# Patient Record
Sex: FEMALE | Race: WHITE | Employment: FULL TIME | URBAN - METROPOLITAN AREA
[De-identification: names, ages, dates, MRNs, and addresses within clinical notes are randomized per-mention and may not be internally consistent; named-entity substitution may affect disease eponyms.]

---

## 2022-03-10 ENCOUNTER — TRANSCRIBE ORDER (OUTPATIENT)
Dept: SCHEDULING | Age: 22
End: 2022-03-10

## 2022-03-10 DIAGNOSIS — H57.02 ANISOCORIA: Primary | ICD-10-CM

## 2022-03-10 DIAGNOSIS — G44.209 TENSION HEADACHE: ICD-10-CM

## 2022-03-11 ENCOUNTER — HOSPITAL ENCOUNTER (OUTPATIENT)
Dept: MRI IMAGING | Age: 22
Discharge: HOME | End: 2022-03-11
Attending: INTERNAL MEDICINE
Payer: COMMERCIAL

## 2022-03-11 DIAGNOSIS — G44.209 TENSION HEADACHE: ICD-10-CM

## 2022-03-11 DIAGNOSIS — H57.02 ANISOCORIA: ICD-10-CM

## 2022-03-11 PROCEDURE — 74011250636 HC RX REV CODE- 250/636

## 2022-03-11 PROCEDURE — A9576 INJ PROHANCE MULTIPACK: HCPCS

## 2022-03-11 PROCEDURE — 70553 MRI BRAIN STEM W/O & W/DYE: CPT

## 2022-03-11 RX ORDER — SODIUM CHLORIDE 0.9 % (FLUSH) 0.9 %
10 SYRINGE (ML) INJECTION
Status: COMPLETED | OUTPATIENT
Start: 2022-03-11 | End: 2022-03-11

## 2022-03-11 RX ADMIN — Medication 10 ML: at 07:23

## 2022-03-11 RX ADMIN — GADOTERIDOL 12 ML: 279.3 INJECTION, SOLUTION INTRAVENOUS at 07:22

## 2023-05-30 ENCOUNTER — OFFICE VISIT (OUTPATIENT)
Dept: ORTHOPEDIC SURGERY | Age: 23
End: 2023-05-30

## 2023-05-30 VITALS — WEIGHT: 132 LBS

## 2023-05-30 DIAGNOSIS — M77.02 MEDIAL EPICONDYLITIS OF ELBOW, LEFT: Primary | ICD-10-CM

## 2023-05-30 RX ORDER — VILAZODONE HYDROCHLORIDE 20 MG/1
20 TABLET ORAL DAILY
COMMUNITY
Start: 2021-06-17

## 2023-05-30 RX ORDER — METHYLPREDNISOLONE 4 MG/1
TABLET ORAL
Qty: 1 KIT | Refills: 0 | Status: SHIPPED | OUTPATIENT
Start: 2023-05-30

## 2023-05-30 RX ORDER — DEXTROAMPHETAMINE SACCHARATE, AMPHETAMINE ASPARTATE, DEXTROAMPHETAMINE SULFATE AND AMPHETAMINE SULFATE 7.5; 7.5; 7.5; 7.5 MG/1; MG/1; MG/1; MG/1
30 TABLET ORAL 2 TIMES DAILY PRN
COMMUNITY
Start: 2019-11-12

## 2023-05-30 NOTE — PROGRESS NOTES
Name: Momo Saleem  YOB: 2000  Gender: female  MRN: [id number]      CC: Elbow Injury (L)       HPI: Momo Saleem is a 23 y.o. female who presents with Elbow Injury (L)  Asaf Hines is a new patient who presents today with pain in her L elbow. She has had a history of medial epicondylitis in this elbow in 2016, and reports her current symptoms are congruent with those. Her medial epicondyle is tender to palpation, and she has pain and paresthesia extending down the arm, into her fingers. She also reports a burning sensation following this pattern as well. .  She has been using irons that she feels are too heavy which give her overuse of the medial side of the elbow she has pain when she  and lifts in her flexor type motion. She has some physical therapy previously which provided some benefit but she has not had dry needling. Physical Examination:  General: no acute distress  Lungs: breathing easily  CV: regular rhythm by pulse  Left elbow. :  Tenderness palpation over the flexor pronator mass in the medial epicondyle which recreates symptoms. Negative Tinel's over the ulnar nerve but this does create some pain. Does not have recreation of tingling symptoms in her hand. Full passive and active range of motion of her elbow she has some resisted wrist flexion pain over the medial side of the elbow. Negative finger and wrist extension testing nontender over the lateral epicondyle. Motor and sensory function grossly intact with 5 out of 5 motor function no instability to her elbow. Imaging:   No imaging today  All imaging interpreted by myself Omar Lott MD independent of radiology review        Assessment:     ICD-10-CM    1. Medial epicondylitis of elbow, left  M77.02 Amb External Referral To Physical Therapy     methylPREDNISolone (MEDROL, TAMMY,) 4 MG tablet          Plan:   History and exam consistent with golfers elbow medial epicondylitis.   We discussed an

## 2023-05-31 ENCOUNTER — TELEPHONE (OUTPATIENT)
Dept: ORTHOPEDIC SURGERY | Age: 23
End: 2023-05-31

## 2023-05-31 NOTE — TELEPHONE ENCOUNTER
Spoke with patient and let her know someone from injury scripts will each out to her for the topical

## 2023-07-07 ENCOUNTER — TELEPHONE (OUTPATIENT)
Dept: ORTHOPEDIC SURGERY | Age: 23
End: 2023-07-07

## 2023-07-07 DIAGNOSIS — M77.02 MEDIAL EPICONDYLITIS OF ELBOW, LEFT: Primary | ICD-10-CM

## 2023-07-10 ENCOUNTER — OFFICE VISIT (OUTPATIENT)
Dept: ORTHOPEDIC SURGERY | Age: 23
End: 2023-07-10

## 2023-07-10 DIAGNOSIS — M77.02 MEDIAL EPICONDYLITIS OF ELBOW, LEFT: Primary | ICD-10-CM

## 2023-07-10 NOTE — PROGRESS NOTES
Name: Tereza Christianson  YOB: 2000  Gender: female  MRN: [id number]      CC: Elbow Pain (L)       HPI: Tereza Christianson is a 20 y.o. female who presents with Elbow Pain (L)  . Continues to have pain over the medial epicondyle. She had an MRI scan which demonstrated medial epicondylitis        Physical Examination:  General: no acute distress  left elbow: Tenderness to palpation of the common flexor and pronator mass in the medial epicondyle nontender posterior to the medial epicondyle negative Tinel's over the ulnar nerve full range of motion recreation of symptoms with resisted elbow and wrist flexion. Assessment:   1. Medial epicondylitis of elbow, left         Plan:     Continues to Have medial epicondylitis has not responded to rest and topical anti-inflammatories and physical therapy. We discussed cortisone injection versus potentially platelet rich plasma injection. She would like to take a long break off from golf at this point and try the PRP injections. We discussed 1 and potentially a second PRP injection. She elected to proceed with that today. Standard blood draw from antecubital fossa was performed and platelet rich plasma processed and spun in the [employer] from [employer]. This yielded 4 cc of autologous ACP PRP. Ultrasound probe was placed on the left elbow over the flexor pronator mass and in the transverse plane initially this was identified in the longitudinal plane the area of maximal tenderness was identified. We then localized the needle and longitudinal axis and 4 cc of leukocyte rich platelet rich plasma was injected on a 22-gauge needle into the flexor pronator mass in the area of hypoechogenicity after sterile prep. Patient tolerated the procedure well and was given postinjection flare precautions and instructed to avoid strenuous activity and anti-inflammatories for the next 48 to 72 hours. Follow up 2 WEEKS        Omar Harmon MD,

## 2023-07-31 ENCOUNTER — OFFICE VISIT (OUTPATIENT)
Dept: ORTHOPEDIC SURGERY | Age: 23
End: 2023-07-31
Payer: COMMERCIAL

## 2023-07-31 DIAGNOSIS — M77.02 MEDIAL EPICONDYLITIS OF ELBOW, LEFT: Primary | ICD-10-CM

## 2023-07-31 PROCEDURE — G8428 CUR MEDS NOT DOCUMENT: HCPCS | Performed by: ORTHOPAEDIC SURGERY

## 2023-07-31 PROCEDURE — G8421 BMI NOT CALCULATED: HCPCS | Performed by: ORTHOPAEDIC SURGERY

## 2023-07-31 PROCEDURE — 99213 OFFICE O/P EST LOW 20 MIN: CPT | Performed by: ORTHOPAEDIC SURGERY

## 2023-07-31 PROCEDURE — 4004F PT TOBACCO SCREEN RCVD TLK: CPT | Performed by: ORTHOPAEDIC SURGERY

## 2023-07-31 NOTE — PROGRESS NOTES
Name: Mariana Hurt  YOB: 2000  Gender: female  MRN: [id number]      CC: Elbow Pain (L)       HPI: Mariana Hurt is a 20 y.o. female who returns for follow up on  her left elbow. She does think she is doing much better after the PRP injection and is progressing with physical therapy. She has been starting chipping and putting and hopefully progress to short wedge shots next week. Physical Examination:  General: no acute distress  Lungs: breathing easily  CV: regular rhythm by pulse  Left elbow : Still some residual tenderness palpation of the common flexor pronator mass but decreased compared to previously. Still some mild hypersensitivity over the medial epicondyle no radicular ulnar nerve symptoms with Tinel's. Assessment:     ICD-10-CM    1. Medial epicondylitis of elbow, left  M77.02           Plan:   I do think she is making improvement. We talked about progressing her activity back in the golf with her physical therapist.  We will hold off on repeat PRP injection at this time. I will plan to see her back in about 3 weeks. I did review her case with Dr. Porter Shearer of my partner who specializes in hand and upper extremity surgery. She agrees with the treatment plan and the required period of rest due to the overuse injury of the medial epicondylitis as well as the PRP injection which was indicated and potentially a second injection if she is not able to improve to her satisfaction. Matt Hester MD, [address] [facility]

## 2023-09-13 ENCOUNTER — TELEPHONE (OUTPATIENT)
Dept: ORTHOPEDIC SURGERY | Age: 23
End: 2023-09-13

## 2023-09-13 NOTE — TELEPHONE ENCOUNTER
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

## 2023-09-15 NOTE — TELEPHONE ENCOUNTER
Pt will be picking up note from Austin Nraanjo and friend at [place] on Monday 13/94/7119 Otezla Counseling: The side effects of Otezla were discussed with the patient, including but not limited to worsening or new depression, weight loss, diarrhea, nausea, upper respiratory tract infection, and headache. Patient instructed to call the office should any adverse effect occur.  The patient verbalized understanding of the proper use and possible adverse effects of Otezla.  All the patient's questions and concerns were addressed.

## 2024-03-15 NOTE — PROGRESS NOTES
The patient is a 23 y.o. No obstetric history on file. who is seen to discuss her new pregnancy. Pt denies any current unilateral pain, cramping, urinary symptoms, or vaginal bleeding. She is currently taking an over the counter PNV with DHA and folic acid.     Reports long hx irregular menses    Pt is no longer taking adderall and viibryd. Feels well off meds.       LMP:1/17/24  GLORIA based off of LMP:10/23/24  GA today by LMP: 8w5d    US Findings from today 03/18/24:   GYN U/S SECONDARY TO MISSED MENSES   CX WNL   UT IS ANTEVERTED AND HETEROGENOUS   SINGLE IUP WITH FHT= 126   CRL IS NOT C/W LMP. CRL= 7W1D, GLORIA= 11/3/2024. PT STATES CYCLES ARE IRREGULAR.   YS VISUALIZED   ROV VISUALIZED WNL   LOV VISUALIZED WITH HEMORRHAGIC CL   NO ADN MASSES, FREE FLUID OR AMITA NOTED.     HISTORY:    No obstetric history on file.  Patient's last menstrual period was 01/17/2024 (exact date).  Sexual History:  has sex with males  Contraception:  none  Current Outpatient Medications on File Prior to Visit   Medication Sig Dispense Refill    Prenatal MV-Min-Fe Fum-FA-DHA (PRENATAL/FOLIC ACID+DHA PO) Take by mouth      vilazodone HCl (VIIBRYD) 20 MG TABS Take 1 tablet by mouth daily (Patient not taking: Reported on 3/18/2024)      amphetamine-dextroamphetamine (ADDERALL) 30 MG tablet Take 1 tablet by mouth 2 times daily as needed. Max Daily Amount: 60 mg (Patient not taking: Reported on 3/18/2024)      methylPREDNISolone (MEDROL, TAMMY,) 4 MG tablet Take as directed on package (Patient not taking: Reported on 3/18/2024) 1 kit 0     No current facility-administered medications on file prior to visit.       ROS:  Feeling well. No dyspnea or chest pain on exertion.  No abdominal pain, change in bowel habits, black or bloody stools.  No urinary tract symptoms. GYN ROS: she complains of missed period.    PHYSICAL EXAM:  Weight 66.7 kg (147 lb 1.6 oz), last menstrual period 01/17/2024.    The patient appears well, alert, oriented x 3, in no

## 2024-03-18 ENCOUNTER — OFFICE VISIT (OUTPATIENT)
Dept: OBGYN CLINIC | Age: 24
End: 2024-03-18
Payer: COMMERCIAL

## 2024-03-18 ENCOUNTER — PROCEDURE VISIT (OUTPATIENT)
Dept: OBGYN CLINIC | Age: 24
End: 2024-03-18
Payer: COMMERCIAL

## 2024-03-18 VITALS — SYSTOLIC BLOOD PRESSURE: 112 MMHG | WEIGHT: 147.1 LBS | DIASTOLIC BLOOD PRESSURE: 60 MMHG

## 2024-03-18 DIAGNOSIS — N92.6 MISSED MENSES: Primary | ICD-10-CM

## 2024-03-18 DIAGNOSIS — Z32.00 ENCOUNTER FOR PREGNANCY TEST, RESULT UNKNOWN: Primary | ICD-10-CM

## 2024-03-18 DIAGNOSIS — N92.6 MISSED MENSES: ICD-10-CM

## 2024-03-18 LAB
HCG, PREGNANCY, URINE, POC: POSITIVE
VALID INTERNAL CONTROL, POC: YES

## 2024-03-18 PROCEDURE — 76830 TRANSVAGINAL US NON-OB: CPT | Performed by: OBSTETRICS & GYNECOLOGY

## 2024-03-18 PROCEDURE — G8421 BMI NOT CALCULATED: HCPCS | Performed by: NURSE PRACTITIONER

## 2024-03-18 PROCEDURE — 81025 URINE PREGNANCY TEST: CPT | Performed by: NURSE PRACTITIONER

## 2024-03-18 PROCEDURE — G8427 DOCREV CUR MEDS BY ELIG CLIN: HCPCS | Performed by: NURSE PRACTITIONER

## 2024-03-18 PROCEDURE — 1036F TOBACCO NON-USER: CPT | Performed by: NURSE PRACTITIONER

## 2024-03-18 PROCEDURE — 99204 OFFICE O/P NEW MOD 45 MIN: CPT | Performed by: NURSE PRACTITIONER

## 2024-03-18 PROCEDURE — G8484 FLU IMMUNIZE NO ADMIN: HCPCS | Performed by: NURSE PRACTITIONER

## 2024-04-02 ENCOUNTER — TELEPHONE (OUTPATIENT)
Dept: OBGYN CLINIC | Age: 24
End: 2024-04-02

## 2024-04-02 NOTE — TELEPHONE ENCOUNTER
Patient canceled appointment on 4/1 for f/u US and visit.   Called patient and LM advising to call back if needs to reschedule.

## 2024-05-08 ENCOUNTER — ROUTINE PRENATAL (OUTPATIENT)
Dept: OBGYN CLINIC | Age: 24
End: 2024-05-08
Payer: COMMERCIAL

## 2024-05-08 ENCOUNTER — INITIAL PRENATAL (OUTPATIENT)
Dept: OBGYN CLINIC | Age: 24
End: 2024-05-08

## 2024-05-08 ENCOUNTER — PROCEDURE VISIT (OUTPATIENT)
Dept: OBGYN CLINIC | Age: 24
End: 2024-05-08
Payer: COMMERCIAL

## 2024-05-08 VITALS
WEIGHT: 153.3 LBS | BODY MASS INDEX: 24.06 KG/M2 | DIASTOLIC BLOOD PRESSURE: 74 MMHG | HEIGHT: 67 IN | SYSTOLIC BLOOD PRESSURE: 116 MMHG

## 2024-05-08 DIAGNOSIS — O36.80X0 ENCOUNTER TO DETERMINE FETAL VIABILITY OF PREGNANCY, SINGLE OR UNSPECIFIED FETUS: Primary | ICD-10-CM

## 2024-05-08 DIAGNOSIS — Z82.0 FAMILY HISTORY OF MUSCULAR DYSTROPHY: ICD-10-CM

## 2024-05-08 DIAGNOSIS — Z13.89 SCREENING FOR GENITOURINARY CONDITION: ICD-10-CM

## 2024-05-08 DIAGNOSIS — Z34.82 ENCOUNTER FOR SUPERVISION OF OTHER NORMAL PREGNANCY, SECOND TRIMESTER: ICD-10-CM

## 2024-05-08 DIAGNOSIS — Z3A.14 14 WEEKS GESTATION OF PREGNANCY: ICD-10-CM

## 2024-05-08 DIAGNOSIS — Z11.3 SCREENING EXAMINATION FOR VENEREAL DISEASE: ICD-10-CM

## 2024-05-08 DIAGNOSIS — F41.9 ANXIETY: ICD-10-CM

## 2024-05-08 DIAGNOSIS — Z34.82 PRENATAL CARE, SUBSEQUENT PREGNANCY, SECOND TRIMESTER: Primary | ICD-10-CM

## 2024-05-08 DIAGNOSIS — Z34.82 PRENATAL CARE, SUBSEQUENT PREGNANCY, SECOND TRIMESTER: ICD-10-CM

## 2024-05-08 DIAGNOSIS — Z34.02 ENCOUNTER FOR SUPERVISION OF NORMAL FIRST PREGNANCY IN SECOND TRIMESTER: Primary | ICD-10-CM

## 2024-05-08 DIAGNOSIS — Z11.59 SCREENING FOR VIRAL AND CHLAMYDIAL DISEASES: ICD-10-CM

## 2024-05-08 DIAGNOSIS — Z11.8 SCREENING FOR VIRAL AND CHLAMYDIAL DISEASES: ICD-10-CM

## 2024-05-08 PROBLEM — A60.09 GENITAL HERPES AFFECTING PREGNANCY: Status: ACTIVE | Noted: 2024-05-08

## 2024-05-08 PROBLEM — Z34.90 PREGNANCY: Status: ACTIVE | Noted: 2024-05-08

## 2024-05-08 PROBLEM — O98.319 GENITAL HERPES AFFECTING PREGNANCY: Status: ACTIVE | Noted: 2024-05-08

## 2024-05-08 LAB
ABO + RH BLD: NORMAL
BASOPHILS # BLD: 0 K/UL (ref 0–0.2)
BASOPHILS NFR BLD: 1 % (ref 0–2)
BLOOD GROUP ANTIBODIES SERPL: NORMAL
DIFFERENTIAL METHOD BLD: ABNORMAL
EOSINOPHIL # BLD: 0.1 K/UL (ref 0–0.8)
EOSINOPHIL NFR BLD: 2 % (ref 0.5–7.8)
ERYTHROCYTE [DISTWIDTH] IN BLOOD BY AUTOMATED COUNT: 12.8 % (ref 11.9–14.6)
EST. AVERAGE GLUCOSE BLD GHB EST-MCNC: 99 MG/DL
GLUCOSE URINE, POC: NEGATIVE
HBA1C MFR BLD: 5.1 % (ref 0–5.6)
HCT VFR BLD AUTO: 36.4 % (ref 35.8–46.3)
HGB BLD-MCNC: 12.1 G/DL (ref 11.7–15.4)
HIV 1+2 AB+HIV1 P24 AG SERPL QL IA: NONREACTIVE
HIV 1/2 RESULT COMMENT: NORMAL
IMM GRANULOCYTES # BLD AUTO: 0 K/UL (ref 0–0.5)
IMM GRANULOCYTES NFR BLD AUTO: 0 % (ref 0–5)
LYMPHOCYTES # BLD: 1.7 K/UL (ref 0.5–4.6)
LYMPHOCYTES NFR BLD: 22 % (ref 13–44)
MCH RBC QN AUTO: 30.2 PG (ref 26.1–32.9)
MCHC RBC AUTO-ENTMCNC: 33.2 G/DL (ref 31.4–35)
MCV RBC AUTO: 90.8 FL (ref 82–102)
MONOCYTES # BLD: 0.4 K/UL (ref 0.1–1.3)
MONOCYTES NFR BLD: 5 % (ref 4–12)
NEUTS SEG # BLD: 5.6 K/UL (ref 1.7–8.2)
NEUTS SEG NFR BLD: 70 % (ref 43–78)
NRBC # BLD: 0 K/UL (ref 0–0.2)
PLATELET # BLD AUTO: 230 K/UL (ref 150–450)
PMV BLD AUTO: 11.5 FL (ref 9.4–12.3)
PROTEIN,URINE, POC: NEGATIVE
RBC # BLD AUTO: 4.01 M/UL (ref 4.05–5.2)
RUBV IGG SERPL IA-ACNC: 268 IU/ML
T PALLIDUM AB SER QL IA: NONREACTIVE
WBC # BLD AUTO: 7.9 K/UL (ref 4.3–11.1)

## 2024-05-08 PROCEDURE — 0502F SUBSEQUENT PRENATAL CARE: CPT | Performed by: OBSTETRICS & GYNECOLOGY

## 2024-05-08 PROCEDURE — 76816 OB US FOLLOW-UP PER FETUS: CPT | Performed by: OBSTETRICS & GYNECOLOGY

## 2024-05-08 PROCEDURE — 81002 URINALYSIS NONAUTO W/O SCOPE: CPT | Performed by: OBSTETRICS & GYNECOLOGY

## 2024-05-08 RX ORDER — VILAZODONE HYDROCHLORIDE 20 MG/1
20 TABLET ORAL DAILY
COMMUNITY

## 2024-05-08 RX ORDER — VALACYCLOVIR HYDROCHLORIDE 500 MG/1
500 TABLET, FILM COATED ORAL 2 TIMES DAILY
COMMUNITY
End: 2024-05-08 | Stop reason: SDUPTHER

## 2024-05-08 RX ORDER — VALACYCLOVIR HYDROCHLORIDE 500 MG/1
500 TABLET, FILM COATED ORAL 2 TIMES DAILY
Qty: 60 TABLET | Refills: 11 | Status: SHIPPED | OUTPATIENT
Start: 2024-05-08

## 2024-05-08 RX ORDER — DEXTROAMPHETAMINE SACCHARATE, AMPHETAMINE ASPARTATE MONOHYDRATE, DEXTROAMPHETAMINE SULFATE AND AMPHETAMINE SULFATE 3.75; 3.75; 3.75; 3.75 MG/1; MG/1; MG/1; MG/1
15 CAPSULE, EXTENDED RELEASE ORAL EVERY MORNING
COMMUNITY

## 2024-05-08 NOTE — PROGRESS NOTES
Erlinda Downing  presents today for nob talk.  EDC 11/3/24 based of of LMP.  Patient is currently 14 days 3 weeks pregnant.    Patient education was discussed in depth and OB book reviewed with patient.  [facility] policies reviewed with patient.    Genetic testing discussed in depth with patient.  Patient elects NIPT/Carrier screening.    Past Medical History: hx of herpes infection-genital type 1 per patient.  Anxiety-took Adderrall and Viibryd prior to pregnancy.  She would like to discuss these medications with the provider today.  Patient had a sibling born with MD, and lived 6 days.  Patient elects carrier screening today, as she has never been tested.    Patient c/o:none    She is seeing Dr. Chand today for nob exam.    All questions answered patient voiced full understanding, consents signed.  Patient encouraged to call with questions or concerns.

## 2024-05-08 NOTE — PROGRESS NOTES
Ms. Downing   presents today for her initial OB exam.  She is present with her mother.    Gc/chl/trich sent off of urine today  Elects NIPT/[facility] Carrier screen today  Requests refill on Valtrex    Last pap smear: 23 (Negative, HPV not performed)- [facility]    Patient's last menstrual period was 2024 (exact date).  Estimated Date of Delivery: 11/3/24  OB History:   OB History    Para Term  AB Living   1 0 0 0 0 0   SAB IAB Ectopic Molar Multiple Live Births   0 0 0 0 0 0      # Outcome Date GA Lbr Jeet/2nd Weight Sex Delivery Anes PTL Lv   1 Current                Past Gyn History:   GYN History         Menarche - age 14.  Always irregular.  On ocps in high school.  Iud in college -removed in .        Allergies:   No Known Allergies    Medication History:  Current Outpatient Medications   Medication Sig Dispense Refill    Prenatal MV-Min-Fe Fum-FA-DHA (PRENATAL/FOLIC ACID+DHA PO) Take by mouth       No current facility-administered medications for this visit.       Past Medical History:  No past medical history on file.    Past Surgical History:  No past surgical history on file.    Social History:  Social History     Socioeconomic History    Marital status:   -since October.       Spouse name: Not on file    Number of children: Not on file    Years of education: Not on file    Highest education level: Not on file   Occupational History    Professional [employer]  Her  is in [employer] for [employer].     Tobacco Use    Smoking status: Never    Smokeless tobacco: Never   Substance and Sexual Activity    Alcohol use: Not on file    Drug use: Not on file    Sexual activity: Not on file   Other Topics Concern    Exercise:  active    Social History Narrative    Not on file     Social Determinants of Health     Financial Resource Strain: Not on file   Food Insecurity: Not on file   Transportation Needs: Not on file   Physical Activity: Not on file   Stress: Not on file   Social

## 2024-05-09 LAB — VZV IGG SER IA-ACNC: 1327 INDEX

## 2024-05-10 LAB
C TRACH RRNA SPEC QL NAA+PROBE: NEGATIVE
HBV SURFACE AG SERPL QL IA: NEGATIVE
HCV AB SERPL QL IA: NORMAL
HCV IGG SERPL QL IA: NON REACTIVE S/CO RATIO
N GONORRHOEA RRNA SPEC QL NAA+PROBE: NEGATIVE
SPECIMEN SOURCE: NORMAL
T VAGINALIS RRNA SPEC QL NAA+PROBE: NEGATIVE

## 2024-05-11 LAB
BACTERIA SPEC CULT: NORMAL
SERVICE CMNT-IMP: NORMAL

## 2024-05-13 LAB
HGB A MFR BLD: 97.5 % (ref 96.4–98.8)
HGB A2 MFR BLD COLUMN CHROM: 2.5 % (ref 1.8–3.2)
HGB F MFR BLD: 0 % (ref 0–2)
HGB FRACT BLD-IMP: NORMAL
HGB S MFR BLD: 0 %

## 2024-05-19 LAB
Lab: NEGATIVE
Lab: NORMAL
NTRA CYSTIC FIBROSIS: NEGATIVE
NTRA DUCHENNE/BECKER MUSCULAR DYSTROPHY: NEGATIVE
NTRA FRAGILE X SYNDROME: NEGATIVE
NTRA SPINAL MUSCULAR ATROPHY: NEGATIVE

## 2024-05-20 LAB
Lab: NORMAL
NTRA FETAL FRACTION: NORMAL
NTRA GENDER OF FETUS: NORMAL
NTRA MONOSOMY X AGE-BASED RISK TEXT: NORMAL
NTRA MONOSOMY X RESULT TEXT: NORMAL
NTRA MONOSOMY X RISK SCORE TEXT: NORMAL
NTRA TRIPLOIDY RESULT TEXT: NORMAL
NTRA TRISOMY 13 AGE-BASED RISK TEXT: NORMAL
NTRA TRISOMY 13 RESULT TEXT: NORMAL
NTRA TRISOMY 13 RISK SCORE TEXT: NORMAL
NTRA TRISOMY 18 AGE-BASED RISK TEXT: NORMAL
NTRA TRISOMY 18 RESULT TEXT: NORMAL
NTRA TRISOMY 18 RISK SCORE TEXT: NORMAL
NTRA TRISOMY 21 AGE-BASED RISK TEXT: NORMAL
NTRA TRISOMY 21 RESULT TEXT: NORMAL
NTRA TRISOMY 21 RISK SCORE TEXT: NORMAL

## 2024-05-30 SDOH — ECONOMIC STABILITY: INCOME INSECURITY: HOW HARD IS IT FOR YOU TO PAY FOR THE VERY BASICS LIKE FOOD, HOUSING, MEDICAL CARE, AND HEATING?: NOT HARD AT ALL

## 2024-05-30 SDOH — ECONOMIC STABILITY: HOUSING INSECURITY
IN THE LAST 12 MONTHS, WAS THERE A TIME WHEN YOU DID NOT HAVE A STEADY PLACE TO SLEEP OR SLEPT IN A SHELTER (INCLUDING NOW)?: NO

## 2024-05-30 SDOH — ECONOMIC STABILITY: FOOD INSECURITY: WITHIN THE PAST 12 MONTHS, THE FOOD YOU BOUGHT JUST DIDN'T LAST AND YOU DIDN'T HAVE MONEY TO GET MORE.: NEVER TRUE

## 2024-05-30 SDOH — ECONOMIC STABILITY: FOOD INSECURITY: WITHIN THE PAST 12 MONTHS, YOU WORRIED THAT YOUR FOOD WOULD RUN OUT BEFORE YOU GOT MONEY TO BUY MORE.: NEVER TRUE

## 2024-05-30 SDOH — ECONOMIC STABILITY: TRANSPORTATION INSECURITY
IN THE PAST 12 MONTHS, HAS LACK OF TRANSPORTATION KEPT YOU FROM MEETINGS, WORK, OR FROM GETTING THINGS NEEDED FOR DAILY LIVING?: NO

## 2024-05-31 ENCOUNTER — ROUTINE PRENATAL (OUTPATIENT)
Dept: OBGYN CLINIC | Age: 24
End: 2024-05-31
Payer: COMMERCIAL

## 2024-05-31 VITALS — DIASTOLIC BLOOD PRESSURE: 60 MMHG | WEIGHT: 155.9 LBS | SYSTOLIC BLOOD PRESSURE: 112 MMHG | BODY MASS INDEX: 24.42 KG/M2

## 2024-05-31 DIAGNOSIS — Z34.82 PRENATAL CARE, SUBSEQUENT PREGNANCY, SECOND TRIMESTER: Primary | ICD-10-CM

## 2024-05-31 DIAGNOSIS — Z3A.17 17 WEEKS GESTATION OF PREGNANCY: ICD-10-CM

## 2024-05-31 DIAGNOSIS — Z13.89 SCREENING FOR GENITOURINARY CONDITION: ICD-10-CM

## 2024-05-31 LAB
GLUCOSE URINE, POC: NEGATIVE
PROTEIN,URINE, POC: NEGATIVE

## 2024-05-31 PROCEDURE — 81002 URINALYSIS NONAUTO W/O SCOPE: CPT | Performed by: NURSE PRACTITIONER

## 2024-05-31 PROCEDURE — 0502F SUBSEQUENT PRENATAL CARE: CPT | Performed by: NURSE PRACTITIONER

## 2024-06-20 ENCOUNTER — PROCEDURE VISIT (OUTPATIENT)
Dept: OBGYN CLINIC | Age: 24
End: 2024-06-20
Payer: COMMERCIAL

## 2024-06-20 ENCOUNTER — ROUTINE PRENATAL (OUTPATIENT)
Dept: OBGYN CLINIC | Age: 24
End: 2024-06-20
Payer: COMMERCIAL

## 2024-06-20 VITALS — BODY MASS INDEX: 24.43 KG/M2 | WEIGHT: 156 LBS | DIASTOLIC BLOOD PRESSURE: 62 MMHG | SYSTOLIC BLOOD PRESSURE: 116 MMHG

## 2024-06-20 DIAGNOSIS — Z3A.20 20 WEEKS GESTATION OF PREGNANCY: ICD-10-CM

## 2024-06-20 DIAGNOSIS — Z13.89 SCREENING FOR GENITOURINARY CONDITION: ICD-10-CM

## 2024-06-20 DIAGNOSIS — Z36.89 SCREENING, ANTENATAL, FOR FETAL ANATOMIC SURVEY: Primary | ICD-10-CM

## 2024-06-20 DIAGNOSIS — Z34.82 PRENATAL CARE, SUBSEQUENT PREGNANCY, SECOND TRIMESTER: Primary | ICD-10-CM

## 2024-06-20 LAB
GLUCOSE URINE, POC: NEGATIVE
PROTEIN,URINE, POC: NEGATIVE

## 2024-06-20 PROCEDURE — 0502F SUBSEQUENT PRENATAL CARE: CPT | Performed by: OBSTETRICS & GYNECOLOGY

## 2024-06-20 PROCEDURE — 81002 URINALYSIS NONAUTO W/O SCOPE: CPT | Performed by: OBSTETRICS & GYNECOLOGY

## 2024-06-20 PROCEDURE — 76805 OB US >/= 14 WKS SNGL FETUS: CPT | Performed by: OBSTETRICS & GYNECOLOGY

## 2024-06-20 NOTE — PROGRESS NOTES
US findings:  Anatomy US preformed.   All visualized anatomy appears WNL.   ANATOMY COMPLETE   Cx WNL TVUS preformed. Placenta tip 2.4cm from internal os.   Placenta Anterior Grade 1   Breech   NIPT Low Risk Female

## 2024-06-20 NOTE — PROGRESS NOTES
Ptl precautions. Anatomy us today.  Overall growth 35% and ac 28%.  Cervical length.  All anatomy wnl. Anterior placenta.  Pt not really feeling baby move yet.  Eating well and exercising most days of the week.  Breech.  Rtc 4 weeks.

## 2024-07-17 ENCOUNTER — ROUTINE PRENATAL (OUTPATIENT)
Dept: OBGYN CLINIC | Age: 24
End: 2024-07-17
Payer: COMMERCIAL

## 2024-07-17 VITALS — SYSTOLIC BLOOD PRESSURE: 124 MMHG | WEIGHT: 162.6 LBS | BODY MASS INDEX: 25.47 KG/M2 | DIASTOLIC BLOOD PRESSURE: 72 MMHG

## 2024-07-17 DIAGNOSIS — Z34.82 PRENATAL CARE, SUBSEQUENT PREGNANCY, SECOND TRIMESTER: Primary | ICD-10-CM

## 2024-07-17 DIAGNOSIS — Z13.89 SCREENING FOR GENITOURINARY CONDITION: ICD-10-CM

## 2024-07-17 DIAGNOSIS — Z3A.24 24 WEEKS GESTATION OF PREGNANCY: ICD-10-CM

## 2024-07-17 LAB
GLUCOSE URINE, POC: NEGATIVE
PROTEIN,URINE, POC: NEGATIVE

## 2024-07-17 PROCEDURE — 0502F SUBSEQUENT PRENATAL CARE: CPT | Performed by: NURSE PRACTITIONER

## 2024-07-17 PROCEDURE — 81002 URINALYSIS NONAUTO W/O SCOPE: CPT | Performed by: NURSE PRACTITIONER

## 2024-08-15 ENCOUNTER — ROUTINE PRENATAL (OUTPATIENT)
Dept: OBGYN CLINIC | Age: 24
End: 2024-08-15
Payer: COMMERCIAL

## 2024-08-15 VITALS — WEIGHT: 166.6 LBS | DIASTOLIC BLOOD PRESSURE: 74 MMHG | SYSTOLIC BLOOD PRESSURE: 120 MMHG | BODY MASS INDEX: 26.09 KG/M2

## 2024-08-15 DIAGNOSIS — Z34.83 PRENATAL CARE, SUBSEQUENT PREGNANCY, THIRD TRIMESTER: Primary | ICD-10-CM

## 2024-08-15 DIAGNOSIS — Z3A.28 28 WEEKS GESTATION OF PREGNANCY: ICD-10-CM

## 2024-08-15 DIAGNOSIS — Z13.0 SCREENING FOR IRON DEFICIENCY ANEMIA: ICD-10-CM

## 2024-08-15 DIAGNOSIS — Z13.1 SCREENING FOR DIABETES MELLITUS: ICD-10-CM

## 2024-08-15 DIAGNOSIS — Z13.89 SCREENING FOR GENITOURINARY CONDITION: ICD-10-CM

## 2024-08-15 LAB
BASOPHILS # BLD: 0 K/UL (ref 0–0.2)
BASOPHILS NFR BLD: 0 % (ref 0–2)
DIFFERENTIAL METHOD BLD: ABNORMAL
EOSINOPHIL # BLD: 0.1 K/UL (ref 0–0.8)
EOSINOPHIL NFR BLD: 1 % (ref 0.5–7.8)
ERYTHROCYTE [DISTWIDTH] IN BLOOD BY AUTOMATED COUNT: 12.4 % (ref 11.9–14.6)
GLUCOSE 1 HOUR: 88 MG/DL
GLUCOSE URINE, POC: NEGATIVE
HCT VFR BLD AUTO: 34.6 % (ref 35.8–46.3)
HGB BLD-MCNC: 10.6 G/DL (ref 11.7–15.4)
IMM GRANULOCYTES # BLD AUTO: 0.1 K/UL (ref 0–0.5)
IMM GRANULOCYTES NFR BLD AUTO: 1 % (ref 0–5)
LYMPHOCYTES # BLD: 1.7 K/UL (ref 0.5–4.6)
LYMPHOCYTES NFR BLD: 19 % (ref 13–44)
MCH RBC QN AUTO: 28.6 PG (ref 26.1–32.9)
MCHC RBC AUTO-ENTMCNC: 30.6 G/DL (ref 31.4–35)
MCV RBC AUTO: 93.3 FL (ref 82–102)
MONOCYTES # BLD: 0.6 K/UL (ref 0.1–1.3)
MONOCYTES NFR BLD: 6 % (ref 4–12)
NEUTS SEG # BLD: 6.6 K/UL (ref 1.7–8.2)
NEUTS SEG NFR BLD: 73 % (ref 43–78)
NRBC # BLD: 0 K/UL (ref 0–0.2)
PLATELET # BLD AUTO: 250 K/UL (ref 150–450)
PMV BLD AUTO: 10.6 FL (ref 9.4–12.3)
PROTEIN,URINE, POC: NEGATIVE
RBC # BLD AUTO: 3.71 M/UL (ref 4.05–5.2)
T PALLIDUM AB SER QL IA: NONREACTIVE
WBC # BLD AUTO: 9 K/UL (ref 4.3–11.1)

## 2024-08-15 PROCEDURE — 0502F SUBSEQUENT PRENATAL CARE: CPT | Performed by: OBSTETRICS & GYNECOLOGY

## 2024-08-15 PROCEDURE — 81002 URINALYSIS NONAUTO W/O SCOPE: CPT | Performed by: OBSTETRICS & GYNECOLOGY

## 2024-09-16 ENCOUNTER — ROUTINE PRENATAL (OUTPATIENT)
Dept: OBGYN CLINIC | Age: 24
End: 2024-09-16
Payer: COMMERCIAL

## 2024-09-16 VITALS — WEIGHT: 171.4 LBS | BODY MASS INDEX: 26.85 KG/M2 | DIASTOLIC BLOOD PRESSURE: 82 MMHG | SYSTOLIC BLOOD PRESSURE: 128 MMHG

## 2024-09-16 DIAGNOSIS — Z34.83 PRENATAL CARE, SUBSEQUENT PREGNANCY, THIRD TRIMESTER: Primary | ICD-10-CM

## 2024-09-16 DIAGNOSIS — Z13.89 SCREENING FOR GENITOURINARY CONDITION: ICD-10-CM

## 2024-09-16 DIAGNOSIS — Z3A.33 33 WEEKS GESTATION OF PREGNANCY: ICD-10-CM

## 2024-09-16 DIAGNOSIS — Z82.0 FAMILY HISTORY OF MUSCULAR DYSTROPHY: ICD-10-CM

## 2024-09-16 LAB
GLUCOSE URINE, POC: NEGATIVE
PROTEIN,URINE, POC: NEGATIVE

## 2024-09-16 PROCEDURE — 0502F SUBSEQUENT PRENATAL CARE: CPT | Performed by: NURSE PRACTITIONER

## 2024-09-16 PROCEDURE — 81002 URINALYSIS NONAUTO W/O SCOPE: CPT | Performed by: NURSE PRACTITIONER

## 2024-10-02 ENCOUNTER — ROUTINE PRENATAL (OUTPATIENT)
Dept: OBGYN CLINIC | Age: 24
End: 2024-10-02
Payer: COMMERCIAL

## 2024-10-02 VITALS — SYSTOLIC BLOOD PRESSURE: 140 MMHG | DIASTOLIC BLOOD PRESSURE: 78 MMHG

## 2024-10-02 DIAGNOSIS — Z34.83 PRENATAL CARE, SUBSEQUENT PREGNANCY IN THIRD TRIMESTER: Primary | ICD-10-CM

## 2024-10-02 DIAGNOSIS — Z13.89 SCREENING FOR GENITOURINARY CONDITION: ICD-10-CM

## 2024-10-02 DIAGNOSIS — R03.0 ELEVATED BLOOD PRESSURE READING: ICD-10-CM

## 2024-10-02 DIAGNOSIS — Z3A.35 35 WEEKS GESTATION OF PREGNANCY: ICD-10-CM

## 2024-10-02 LAB
GLUCOSE URINE, POC: NEGATIVE
PROTEIN,URINE, POC: NEGATIVE

## 2024-10-02 PROCEDURE — 0502F SUBSEQUENT PRENATAL CARE: CPT | Performed by: OBSTETRICS & GYNECOLOGY

## 2024-10-02 PROCEDURE — 81002 URINALYSIS NONAUTO W/O SCOPE: CPT | Performed by: OBSTETRICS & GYNECOLOGY

## 2024-10-02 NOTE — PROGRESS NOTES
Repeat BP: 142/80   Has some swelling in ankles.   Patient states she has been stressed the past few day with everything going on.   Denies headache.   Denies change in vision.

## 2024-10-02 NOTE — PROGRESS NOTES
142 / 80 - repeat blood pressure. Pt feels well. Still without power- stressed. Her  is home.    Kick counts and ptl precautions.  Starting to have pelvic pressure and likely bh contractions.  Pree labs today. Likely blood pressure elevation from stress.    Rtc Friday for growth us  /bpp /luis daniel.

## 2024-10-03 LAB
BASOPHILS # BLD: 0 K/UL (ref 0–0.2)
BASOPHILS NFR BLD: 1 % (ref 0–2)
DIFFERENTIAL METHOD BLD: ABNORMAL
EOSINOPHIL # BLD: 0 K/UL (ref 0–0.8)
EOSINOPHIL NFR BLD: 1 % (ref 0.5–7.8)
ERYTHROCYTE [DISTWIDTH] IN BLOOD BY AUTOMATED COUNT: 13.3 % (ref 11.9–14.6)
HCT VFR BLD AUTO: 33.6 % (ref 35.8–46.3)
HGB BLD-MCNC: 9.7 G/DL (ref 11.7–15.4)
IMM GRANULOCYTES # BLD AUTO: 0.2 K/UL (ref 0–0.5)
IMM GRANULOCYTES NFR BLD AUTO: 2 % (ref 0–5)
LYMPHOCYTES # BLD: 1.7 K/UL (ref 0.5–4.6)
LYMPHOCYTES NFR BLD: 20 % (ref 13–44)
MCH RBC QN AUTO: 26.3 PG (ref 26.1–32.9)
MCHC RBC AUTO-ENTMCNC: 28.9 G/DL (ref 31.4–35)
MCV RBC AUTO: 91.1 FL (ref 82–102)
MONOCYTES # BLD: 0.6 K/UL (ref 0.1–1.3)
MONOCYTES NFR BLD: 6 % (ref 4–12)
NEUTS SEG # BLD: 6.3 K/UL (ref 1.7–8.2)
NEUTS SEG NFR BLD: 71 % (ref 43–78)
NRBC # BLD: 0 K/UL (ref 0–0.2)
PLATELET # BLD AUTO: 257 K/UL (ref 150–450)
PMV BLD AUTO: 11.6 FL (ref 9.4–12.3)
RBC # BLD AUTO: 3.69 M/UL (ref 4.05–5.2)
WBC # BLD AUTO: 8.8 K/UL (ref 4.3–11.1)

## 2024-10-04 ENCOUNTER — ROUTINE PRENATAL (OUTPATIENT)
Dept: OBGYN CLINIC | Age: 24
End: 2024-10-04

## 2024-10-04 ENCOUNTER — PROCEDURE VISIT (OUTPATIENT)
Dept: OBGYN CLINIC | Age: 24
End: 2024-10-04
Payer: COMMERCIAL

## 2024-10-04 VITALS — SYSTOLIC BLOOD PRESSURE: 138 MMHG | BODY MASS INDEX: 27.25 KG/M2 | DIASTOLIC BLOOD PRESSURE: 78 MMHG | WEIGHT: 174 LBS

## 2024-10-04 DIAGNOSIS — Z3A.35 35 WEEKS GESTATION OF PREGNANCY: ICD-10-CM

## 2024-10-04 DIAGNOSIS — A60.09 GENITAL HERPES AFFECTING PREGNANCY IN THIRD TRIMESTER: ICD-10-CM

## 2024-10-04 DIAGNOSIS — Z13.89 SCREENING FOR GENITOURINARY CONDITION: ICD-10-CM

## 2024-10-04 DIAGNOSIS — O98.313 GENITAL HERPES AFFECTING PREGNANCY IN THIRD TRIMESTER: ICD-10-CM

## 2024-10-04 DIAGNOSIS — R03.0 ELEVATED HEART RATE WITH ELEVATED BLOOD PRESSURE WITHOUT DIAGNOSIS OF HYPERTENSION: ICD-10-CM

## 2024-10-04 DIAGNOSIS — Z34.83 PRENATAL CARE, SUBSEQUENT PREGNANCY IN THIRD TRIMESTER: Primary | ICD-10-CM

## 2024-10-04 DIAGNOSIS — O26.843 UTERINE SIZE DATE DISCREPANCY, THIRD TRIMESTER: Primary | ICD-10-CM

## 2024-10-04 DIAGNOSIS — R03.0 ELEVATED BLOOD PRESSURE READING: ICD-10-CM

## 2024-10-04 DIAGNOSIS — R00.9 ELEVATED HEART RATE WITH ELEVATED BLOOD PRESSURE WITHOUT DIAGNOSIS OF HYPERTENSION: ICD-10-CM

## 2024-10-04 LAB
ALBUMIN SERPL-MCNC: 3 G/DL (ref 3.5–5)
ALBUMIN/GLOB SERPL: 0.8 (ref 1–1.9)
ALP SERPL-CCNC: 154 U/L (ref 35–104)
ALT SERPL-CCNC: 25 U/L (ref 8–45)
ANION GAP SERPL CALC-SCNC: 13 MMOL/L (ref 9–18)
AST SERPL-CCNC: 37 U/L (ref 15–37)
BILIRUB SERPL-MCNC: 0.2 MG/DL (ref 0–1.2)
BUN SERPL-MCNC: 11 MG/DL (ref 6–23)
CALCIUM SERPL-MCNC: 9.1 MG/DL (ref 8.8–10.2)
CHLORIDE SERPL-SCNC: 104 MMOL/L (ref 98–107)
CO2 SERPL-SCNC: 20 MMOL/L (ref 20–28)
CREAT SERPL-MCNC: 0.75 MG/DL (ref 0.6–1.1)
CREAT UR-MCNC: 18.3 MG/DL (ref 28–217)
GLOBULIN SER CALC-MCNC: 3.6 G/DL (ref 2.3–3.5)
GLUCOSE SERPL-MCNC: 72 MG/DL (ref 70–99)
GLUCOSE URINE, POC: NEGATIVE
LDH SERPL L TO P-CCNC: 418 U/L (ref 127–281)
POTASSIUM SERPL-SCNC: 4.5 MMOL/L (ref 3.5–5.1)
PROT SERPL-MCNC: 6.6 G/DL (ref 6.3–8.2)
PROT UR-MCNC: <6 MG/DL
PROT/CREAT UR-RTO: ABNORMAL
PROTEIN,URINE, POC: NEGATIVE
SODIUM SERPL-SCNC: 137 MMOL/L (ref 136–145)
URATE SERPL-MCNC: 4 MG/DL (ref 2.5–7.1)

## 2024-10-04 PROCEDURE — 76818 FETAL BIOPHYS PROFILE W/NST: CPT | Performed by: STUDENT IN AN ORGANIZED HEALTH CARE EDUCATION/TRAINING PROGRAM

## 2024-10-04 PROCEDURE — 76816 OB US FOLLOW-UP PER FETUS: CPT | Performed by: STUDENT IN AN ORGANIZED HEALTH CARE EDUCATION/TRAINING PROGRAM

## 2024-10-04 NOTE — PROGRESS NOTES
01/17/2024 (Exact Date)   BMI 27.25 kg/m²        ASSESSMENT / PLAN:  1. Prenatal care, subsequent pregnancy in third trimester  -     AMB POC OB URINE DIP  2. 35 weeks gestation of pregnancy  Overview:  NIPT: low risk  Carrier: neg x 4  GTT: 88  Flu:  Tdap/RSV:  Orders:  -     AMB POC OB URINE DIP  3. Elevated heart rate with elevated blood pressure without diagnosis of hypertension  Overview:  10/2/24: MRBP x2 in office, not >4h apart. PreE labs and P:C pending.    10/4/24: /78. Will continue to monitor closely.  4. Genital herpes affecting pregnancy in third trimester  Overview:  Takes Valtrex as needed.  Daily @ 36 weeks.  5. Screening for genitourinary condition  -     AMB POC OB URINE DIP         Natividad Singleton MD

## 2024-10-07 ENCOUNTER — TELEPHONE (OUTPATIENT)
Dept: OBGYN CLINIC | Age: 24
End: 2024-10-07

## 2024-10-07 PROBLEM — O99.019 ANEMIA AFFECTING PREGNANCY: Status: ACTIVE | Noted: 2024-10-07

## 2024-10-07 NOTE — TELEPHONE ENCOUNTER
----- Message from Dr. Rebekah Chand DO sent at 10/7/2024  8:56 AM EDT -----  Pree labs are normal.  Hg is low.  Daily oral iron / iron rich diet.

## 2024-10-08 ENCOUNTER — PROCEDURE VISIT (OUTPATIENT)
Dept: OBGYN CLINIC | Age: 24
End: 2024-10-08
Payer: COMMERCIAL

## 2024-10-08 ENCOUNTER — ROUTINE PRENATAL (OUTPATIENT)
Dept: OBGYN CLINIC | Age: 24
End: 2024-10-08
Payer: COMMERCIAL

## 2024-10-08 VITALS — WEIGHT: 175.3 LBS | BODY MASS INDEX: 27.46 KG/M2 | SYSTOLIC BLOOD PRESSURE: 128 MMHG | DIASTOLIC BLOOD PRESSURE: 78 MMHG

## 2024-10-08 DIAGNOSIS — R00.9 ELEVATED HEART RATE WITH ELEVATED BLOOD PRESSURE WITHOUT DIAGNOSIS OF HYPERTENSION: ICD-10-CM

## 2024-10-08 DIAGNOSIS — Z3A.36 36 WEEKS GESTATION OF PREGNANCY: ICD-10-CM

## 2024-10-08 DIAGNOSIS — R03.0 ELEVATED HEART RATE WITH ELEVATED BLOOD PRESSURE WITHOUT DIAGNOSIS OF HYPERTENSION: ICD-10-CM

## 2024-10-08 DIAGNOSIS — Z34.83 PRENATAL CARE, SUBSEQUENT PREGNANCY, THIRD TRIMESTER: Primary | ICD-10-CM

## 2024-10-08 DIAGNOSIS — Z36.85 SCREENING, ANTENATAL, FOR STREPTOCOCCUS B: ICD-10-CM

## 2024-10-08 DIAGNOSIS — R00.9 ELEVATED HEART RATE WITH ELEVATED BLOOD PRESSURE WITHOUT DIAGNOSIS OF HYPERTENSION: Primary | ICD-10-CM

## 2024-10-08 DIAGNOSIS — R03.0 ELEVATED HEART RATE WITH ELEVATED BLOOD PRESSURE WITHOUT DIAGNOSIS OF HYPERTENSION: Primary | ICD-10-CM

## 2024-10-08 DIAGNOSIS — Z13.89 SCREENING FOR GENITOURINARY CONDITION: ICD-10-CM

## 2024-10-08 LAB
GLUCOSE URINE, POC: NEGATIVE
PROTEIN,URINE, POC: NEGATIVE

## 2024-10-08 PROCEDURE — 76818 FETAL BIOPHYS PROFILE W/NST: CPT | Performed by: OBSTETRICS & GYNECOLOGY

## 2024-10-08 PROCEDURE — 59025 FETAL NON-STRESS TEST: CPT | Performed by: NURSE PRACTITIONER

## 2024-10-08 PROCEDURE — 81002 URINALYSIS NONAUTO W/O SCOPE: CPT | Performed by: NURSE PRACTITIONER

## 2024-10-08 PROCEDURE — 0502F SUBSEQUENT PRENATAL CARE: CPT | Performed by: NURSE PRACTITIONER

## 2024-10-08 NOTE — PROGRESS NOTES
US Findings from today 10/8/24  BPP- elevated bp, 6/8 last bpp  FHT= 126 bpm  BPP= 6/8 (no breathing, baby did practice breathing for 20 sec)  KAIA= 12.7 cm  VERTEX  Anterior grade 1 (borderline 2)  Gender: female  Last efw 10-4    Pt seeing Np DA      BPP done due to 6/8 BPP last visit and elevated BP last visit.  Bp stable today  Had wnl pec labs.   Taking valtrex  GFM    NST done  Baseline 130, multiple accelerations noted, moderate variability.   Uterine irritability seen on TOCO.  Labor precautions  Rtc 1 wk with BPP

## 2024-10-11 PROBLEM — B95.1 POSITIVE GBS TEST: Status: ACTIVE | Noted: 2024-10-11

## 2024-10-11 LAB
BACTERIA SPEC CULT: ABNORMAL
SERVICE CMNT-IMP: ABNORMAL

## 2024-10-17 ENCOUNTER — HOSPITAL ENCOUNTER (INPATIENT)
Age: 24
LOS: 3 days | Discharge: HOME | End: 2024-10-20
Attending: OBSTETRICS & GYNECOLOGY | Admitting: STUDENT IN AN ORGANIZED HEALTH CARE EDUCATION/TRAINING PROGRAM
Payer: COMMERCIAL

## 2024-10-17 ENCOUNTER — PREP FOR PROCEDURE (OUTPATIENT)
Dept: OBGYN CLINIC | Age: 24
End: 2024-10-17

## 2024-10-17 ENCOUNTER — PROCEDURE VISIT (OUTPATIENT)
Dept: OBGYN CLINIC | Age: 24
End: 2024-10-17
Payer: COMMERCIAL

## 2024-10-17 ENCOUNTER — ROUTINE PRENATAL (OUTPATIENT)
Dept: OBGYN CLINIC | Age: 24
End: 2024-10-17
Payer: COMMERCIAL

## 2024-10-17 VITALS — WEIGHT: 182.2 LBS | DIASTOLIC BLOOD PRESSURE: 90 MMHG | SYSTOLIC BLOOD PRESSURE: 142 MMHG | BODY MASS INDEX: 28.54 KG/M2

## 2024-10-17 DIAGNOSIS — A60.09 GENITAL HERPES AFFECTING PREGNANCY IN THIRD TRIMESTER: ICD-10-CM

## 2024-10-17 DIAGNOSIS — Z3A.37 37 WEEKS GESTATION OF PREGNANCY: ICD-10-CM

## 2024-10-17 DIAGNOSIS — O99.013 ANEMIA AFFECTING PREGNANCY IN THIRD TRIMESTER: ICD-10-CM

## 2024-10-17 DIAGNOSIS — F41.9 ANXIETY: ICD-10-CM

## 2024-10-17 DIAGNOSIS — R03.0 ELEVATED HEART RATE WITH ELEVATED BLOOD PRESSURE WITHOUT DIAGNOSIS OF HYPERTENSION: Primary | ICD-10-CM

## 2024-10-17 DIAGNOSIS — O13.3 GESTATIONAL HYPERTENSION, THIRD TRIMESTER: Primary | ICD-10-CM

## 2024-10-17 DIAGNOSIS — O16.3 ELEVATED BLOOD PRESSURE AFFECTING PREGNANCY IN THIRD TRIMESTER, ANTEPARTUM: ICD-10-CM

## 2024-10-17 DIAGNOSIS — Z3A.37 37 WEEKS GESTATION OF PREGNANCY: Primary | ICD-10-CM

## 2024-10-17 DIAGNOSIS — Z13.89 SCREENING FOR GENITOURINARY CONDITION: ICD-10-CM

## 2024-10-17 DIAGNOSIS — B95.1 POSITIVE GBS TEST: ICD-10-CM

## 2024-10-17 DIAGNOSIS — O13.3 GESTATIONAL HYPERTENSION, THIRD TRIMESTER: ICD-10-CM

## 2024-10-17 DIAGNOSIS — Z82.0 FAMILY HISTORY OF MUSCULAR DYSTROPHY: ICD-10-CM

## 2024-10-17 DIAGNOSIS — O98.313 GENITAL HERPES AFFECTING PREGNANCY IN THIRD TRIMESTER: ICD-10-CM

## 2024-10-17 DIAGNOSIS — R00.9 ELEVATED HEART RATE WITH ELEVATED BLOOD PRESSURE WITHOUT DIAGNOSIS OF HYPERTENSION: Primary | ICD-10-CM

## 2024-10-17 DIAGNOSIS — Z34.83 PRENATAL CARE, SUBSEQUENT PREGNANCY, THIRD TRIMESTER: Primary | ICD-10-CM

## 2024-10-17 LAB
ABO + RH BLD: NORMAL
ALBUMIN SERPL-MCNC: 2.6 G/DL (ref 3.5–5)
ALBUMIN/GLOB SERPL: 0.7 (ref 1–1.9)
ALP SERPL-CCNC: 172 U/L (ref 35–104)
ALT SERPL-CCNC: 15 U/L (ref 8–45)
ANION GAP SERPL CALC-SCNC: 12 MMOL/L (ref 9–18)
AST SERPL-CCNC: 27 U/L (ref 15–37)
BILIRUB SERPL-MCNC: <0.2 MG/DL (ref 0–1.2)
BLOOD GROUP ANTIBODIES SERPL: NORMAL
BUN SERPL-MCNC: 14 MG/DL (ref 6–23)
CALCIUM SERPL-MCNC: 9.1 MG/DL (ref 8.8–10.2)
CHLORIDE SERPL-SCNC: 105 MMOL/L (ref 98–107)
CO2 SERPL-SCNC: 19 MMOL/L (ref 20–28)
CREAT SERPL-MCNC: 0.67 MG/DL (ref 0.6–1.1)
ERYTHROCYTE [DISTWIDTH] IN BLOOD BY AUTOMATED COUNT: 13.9 % (ref 11.9–14.6)
GLOBULIN SER CALC-MCNC: 3.6 G/DL (ref 2.3–3.5)
GLUCOSE SERPL-MCNC: 99 MG/DL (ref 70–99)
GLUCOSE URINE, POC: NEGATIVE
HCT VFR BLD AUTO: 30.6 % (ref 35.8–46.3)
HGB BLD-MCNC: 9.4 G/DL (ref 11.7–15.4)
LDH SERPL L TO P-CCNC: 213 U/L (ref 127–281)
MCH RBC QN AUTO: 25.3 PG (ref 26.1–32.9)
MCHC RBC AUTO-ENTMCNC: 30.7 G/DL (ref 31.4–35)
MCV RBC AUTO: 82.3 FL (ref 82–102)
NRBC # BLD: 0 K/UL (ref 0–0.2)
PLATELET # BLD AUTO: 247 K/UL (ref 150–450)
PMV BLD AUTO: 11 FL (ref 9.4–12.3)
POTASSIUM SERPL-SCNC: 3.9 MMOL/L (ref 3.5–5.1)
PROT SERPL-MCNC: 6.2 G/DL (ref 6.3–8.2)
PROTEIN,URINE, POC: NEGATIVE
RBC # BLD AUTO: 3.72 M/UL (ref 4.05–5.2)
SODIUM SERPL-SCNC: 136 MMOL/L (ref 136–145)
SPECIMEN EXP DATE BLD: NORMAL
URATE SERPL-MCNC: 5.5 MG/DL (ref 2.5–7.1)
WBC # BLD AUTO: 9.1 K/UL (ref 4.3–11.1)

## 2024-10-17 PROCEDURE — 76819 FETAL BIOPHYS PROFIL W/O NST: CPT | Performed by: OBSTETRICS & GYNECOLOGY

## 2024-10-17 PROCEDURE — 81002 URINALYSIS NONAUTO W/O SCOPE: CPT | Performed by: OBSTETRICS & GYNECOLOGY

## 2024-10-17 PROCEDURE — 86850 RBC ANTIBODY SCREEN: CPT

## 2024-10-17 PROCEDURE — 80053 COMPREHEN METABOLIC PANEL: CPT

## 2024-10-17 PROCEDURE — 6370000000 HC RX 637 (ALT 250 FOR IP): Performed by: STUDENT IN AN ORGANIZED HEALTH CARE EDUCATION/TRAINING PROGRAM

## 2024-10-17 PROCEDURE — 83615 LACTATE (LD) (LDH) ENZYME: CPT

## 2024-10-17 PROCEDURE — 84550 ASSAY OF BLOOD/URIC ACID: CPT

## 2024-10-17 PROCEDURE — 86900 BLOOD TYPING SEROLOGIC ABO: CPT

## 2024-10-17 PROCEDURE — 1100000000 HC RM PRIVATE

## 2024-10-17 PROCEDURE — 6360000002 HC RX W HCPCS: Performed by: STUDENT IN AN ORGANIZED HEALTH CARE EDUCATION/TRAINING PROGRAM

## 2024-10-17 PROCEDURE — 2580000003 HC RX 258: Performed by: STUDENT IN AN ORGANIZED HEALTH CARE EDUCATION/TRAINING PROGRAM

## 2024-10-17 PROCEDURE — 85027 COMPLETE CBC AUTOMATED: CPT

## 2024-10-17 PROCEDURE — 59200 INSERT CERVICAL DILATOR: CPT

## 2024-10-17 PROCEDURE — 0502F SUBSEQUENT PRENATAL CARE: CPT | Performed by: OBSTETRICS & GYNECOLOGY

## 2024-10-17 PROCEDURE — 86901 BLOOD TYPING SEROLOGIC RH(D): CPT

## 2024-10-17 PROCEDURE — 86780 TREPONEMA PALLIDUM: CPT

## 2024-10-17 RX ORDER — TERBUTALINE SULFATE 1 MG/ML
0.25 INJECTION, SOLUTION SUBCUTANEOUS ONCE
Status: CANCELLED | OUTPATIENT
Start: 2024-10-17 | End: 2024-10-17

## 2024-10-17 RX ORDER — SODIUM CHLORIDE 0.9 % (FLUSH) 0.9 %
5-40 SYRINGE (ML) INJECTION PRN
Status: DISCONTINUED | OUTPATIENT
Start: 2024-10-17 | End: 2024-10-18

## 2024-10-17 RX ORDER — SODIUM CHLORIDE, SODIUM LACTATE, POTASSIUM CHLORIDE, CALCIUM CHLORIDE 600; 310; 30; 20 MG/100ML; MG/100ML; MG/100ML; MG/100ML
INJECTION, SOLUTION INTRAVENOUS CONTINUOUS
Status: DISCONTINUED | OUTPATIENT
Start: 2024-10-17 | End: 2024-10-18

## 2024-10-17 RX ORDER — SODIUM CHLORIDE, SODIUM LACTATE, POTASSIUM CHLORIDE, AND CALCIUM CHLORIDE .6; .31; .03; .02 G/100ML; G/100ML; G/100ML; G/100ML
1000 INJECTION, SOLUTION INTRAVENOUS PRN
Status: DISCONTINUED | OUTPATIENT
Start: 2024-10-17 | End: 2024-10-18

## 2024-10-17 RX ORDER — SODIUM CHLORIDE 9 MG/ML
INJECTION, SOLUTION INTRAVENOUS PRN
Status: DISCONTINUED | OUTPATIENT
Start: 2024-10-17 | End: 2024-10-18

## 2024-10-17 RX ORDER — TRANEXAMIC ACID 10 MG/ML
1000 INJECTION, SOLUTION INTRAVENOUS
Status: DISCONTINUED | OUTPATIENT
Start: 2024-10-17 | End: 2024-10-18

## 2024-10-17 RX ORDER — FAMOTIDINE 10 MG/ML
20 INJECTION, SOLUTION INTRAVENOUS 2 TIMES DAILY PRN
Status: CANCELLED | OUTPATIENT
Start: 2024-10-17

## 2024-10-17 RX ORDER — LIDOCAINE HYDROCHLORIDE 10 MG/ML
30 INJECTION, SOLUTION INFILTRATION; PERINEURAL PRN
Status: DISCONTINUED | OUTPATIENT
Start: 2024-10-17 | End: 2024-10-18

## 2024-10-17 RX ORDER — SODIUM CHLORIDE, SODIUM LACTATE, POTASSIUM CHLORIDE, AND CALCIUM CHLORIDE .6; .31; .03; .02 G/100ML; G/100ML; G/100ML; G/100ML
1000 INJECTION, SOLUTION INTRAVENOUS PRN
Status: CANCELLED | OUTPATIENT
Start: 2024-10-17

## 2024-10-17 RX ORDER — SODIUM CHLORIDE 0.9 % (FLUSH) 0.9 %
5-40 SYRINGE (ML) INJECTION PRN
Status: CANCELLED | OUTPATIENT
Start: 2024-10-17

## 2024-10-17 RX ORDER — ONDANSETRON 2 MG/ML
4 INJECTION INTRAMUSCULAR; INTRAVENOUS EVERY 6 HOURS PRN
Status: CANCELLED | OUTPATIENT
Start: 2024-10-17

## 2024-10-17 RX ORDER — POLYETHYLENE GLYCOL 3350 17 G/17G
17 POWDER, FOR SOLUTION ORAL DAILY PRN
Status: CANCELLED | OUTPATIENT
Start: 2024-10-17

## 2024-10-17 RX ORDER — ONDANSETRON 4 MG/1
4 TABLET, ORALLY DISINTEGRATING ORAL EVERY 6 HOURS PRN
Status: CANCELLED | OUTPATIENT
Start: 2024-10-17

## 2024-10-17 RX ORDER — SODIUM CHLORIDE 0.9 % (FLUSH) 0.9 %
5-40 SYRINGE (ML) INJECTION EVERY 12 HOURS SCHEDULED
Status: CANCELLED | OUTPATIENT
Start: 2024-10-17

## 2024-10-17 RX ORDER — CARBOPROST TROMETHAMINE 250 UG/ML
250 INJECTION, SOLUTION INTRAMUSCULAR PRN
Status: DISCONTINUED | OUTPATIENT
Start: 2024-10-17 | End: 2024-10-18

## 2024-10-17 RX ORDER — BUTORPHANOL TARTRATE 1 MG/ML
1 INJECTION, SOLUTION INTRAMUSCULAR; INTRAVENOUS
Status: CANCELLED | OUTPATIENT
Start: 2024-10-17

## 2024-10-17 RX ORDER — POLYETHYLENE GLYCOL 3350 17 G/17G
17 POWDER, FOR SOLUTION ORAL DAILY PRN
Status: DISCONTINUED | OUTPATIENT
Start: 2024-10-17 | End: 2024-10-18

## 2024-10-17 RX ORDER — ONDANSETRON 4 MG/1
4 TABLET, ORALLY DISINTEGRATING ORAL EVERY 6 HOURS PRN
Status: DISCONTINUED | OUTPATIENT
Start: 2024-10-17 | End: 2024-10-18

## 2024-10-17 RX ORDER — TERBUTALINE SULFATE 1 MG/ML
0.25 INJECTION, SOLUTION SUBCUTANEOUS ONCE AS NEEDED
Status: DISCONTINUED | OUTPATIENT
Start: 2024-10-17 | End: 2024-10-18

## 2024-10-17 RX ORDER — METHYLERGONOVINE MALEATE 0.2 MG/ML
200 INJECTION INTRAVENOUS PRN
Status: CANCELLED | OUTPATIENT
Start: 2024-10-17

## 2024-10-17 RX ORDER — METHYLERGONOVINE MALEATE 0.2 MG/ML
200 INJECTION INTRAVENOUS PRN
Status: DISCONTINUED | OUTPATIENT
Start: 2024-10-17 | End: 2024-10-18

## 2024-10-17 RX ORDER — TERBUTALINE SULFATE 1 MG/ML
0.25 INJECTION, SOLUTION SUBCUTANEOUS ONCE
Status: DISCONTINUED | OUTPATIENT
Start: 2024-10-17 | End: 2024-10-17

## 2024-10-17 RX ORDER — SODIUM CHLORIDE, SODIUM LACTATE, POTASSIUM CHLORIDE, AND CALCIUM CHLORIDE .6; .31; .03; .02 G/100ML; G/100ML; G/100ML; G/100ML
500 INJECTION, SOLUTION INTRAVENOUS PRN
Status: DISCONTINUED | OUTPATIENT
Start: 2024-10-17 | End: 2024-10-18

## 2024-10-17 RX ORDER — SODIUM CHLORIDE, SODIUM LACTATE, POTASSIUM CHLORIDE, AND CALCIUM CHLORIDE .6; .31; .03; .02 G/100ML; G/100ML; G/100ML; G/100ML
500 INJECTION, SOLUTION INTRAVENOUS PRN
Status: CANCELLED | OUTPATIENT
Start: 2024-10-17

## 2024-10-17 RX ORDER — SODIUM CHLORIDE, SODIUM LACTATE, POTASSIUM CHLORIDE, CALCIUM CHLORIDE 600; 310; 30; 20 MG/100ML; MG/100ML; MG/100ML; MG/100ML
INJECTION, SOLUTION INTRAVENOUS CONTINUOUS
Status: CANCELLED | OUTPATIENT
Start: 2024-10-17

## 2024-10-17 RX ORDER — ONDANSETRON 2 MG/ML
4 INJECTION INTRAMUSCULAR; INTRAVENOUS EVERY 6 HOURS PRN
Status: DISCONTINUED | OUTPATIENT
Start: 2024-10-17 | End: 2024-10-18

## 2024-10-17 RX ORDER — CARBOPROST TROMETHAMINE 250 UG/ML
250 INJECTION, SOLUTION INTRAMUSCULAR PRN
Status: CANCELLED | OUTPATIENT
Start: 2024-10-17

## 2024-10-17 RX ORDER — SODIUM CHLORIDE 9 MG/ML
INJECTION, SOLUTION INTRAVENOUS PRN
Status: CANCELLED | OUTPATIENT
Start: 2024-10-17

## 2024-10-17 RX ORDER — MISOPROSTOL 100 UG/1
400 TABLET ORAL PRN
Status: CANCELLED | OUTPATIENT
Start: 2024-10-17

## 2024-10-17 RX ORDER — LIDOCAINE HYDROCHLORIDE 10 MG/ML
30 INJECTION, SOLUTION EPIDURAL; INFILTRATION; INTRACAUDAL; PERINEURAL PRN
Status: CANCELLED | OUTPATIENT
Start: 2024-10-17

## 2024-10-17 RX ORDER — SODIUM CHLORIDE 0.9 % (FLUSH) 0.9 %
5-40 SYRINGE (ML) INJECTION EVERY 12 HOURS SCHEDULED
Status: DISCONTINUED | OUTPATIENT
Start: 2024-10-17 | End: 2024-10-18

## 2024-10-17 RX ORDER — MISOPROSTOL 200 UG/1
400 TABLET ORAL PRN
Status: DISCONTINUED | OUTPATIENT
Start: 2024-10-17 | End: 2024-10-18

## 2024-10-17 RX ADMIN — Medication 50 MCG: at 20:20

## 2024-10-17 RX ADMIN — PENICILLIN G POTASSIUM 5 MILLION UNITS: 5000000 INJECTION, POWDER, FOR SOLUTION INTRAMUSCULAR; INTRAVENOUS at 20:21

## 2024-10-17 NOTE — PROGRESS NOTES
+FHT= 137   KAIA= 15 CM   BPP= 8/8   ANTERIOR PLACENTA   VERTEX   LAST EFW= 10/4     Pt denies headache, visual changes, epigastric pain

## 2024-10-17 NOTE — H&P (VIEW-ONLY)
History & Physical    Name: Erlinda Downing MRN: [id number]  SSN: [id number]    YOB: 2000  Age: 24 y.o.  Sex: female      Subjective:     Estimated Date of Delivery: 11/3/24  OB History    Para Term  AB Living   2       1     SAB IAB Ectopic Molar Multiple Live Births   1                # Outcome Date GA Lbr Jeet/2nd Weight Sex Type Anes PTL Lv   2 Current            1 SAB  7w0d              Ms. Downing is admitted with pregnancy at 37w4d for induction of labor due to gestational hypertension. Prenatal course was complicated by  h/o HSV, anxiety, anemia, family h/o muscular dystrophy, positive GBS .  Please see prenatal records for details.    Past Medical History:   Diagnosis Date    Anxiety      Past Surgical History:   Procedure Laterality Date    ORTHOPEDIC SURGERY      wrist    WISDOM TOOTH EXTRACTION       Social History     Occupational History    Not on file   Tobacco Use    Smoking status: Never    Smokeless tobacco: Never   Vaping Use    Vaping status: Never Used   Substance and Sexual Activity    Alcohol use: Not Currently    Drug use: Not Currently    Sexual activity: Yes     Partners: Male     Family History   Problem Relation Age of Onset    Hypertension Maternal Grandfather     High Cholesterol Father        No Known Allergies  Prior to Admission medications    Medication Sig Start Date End Date Taking? Authorizing Provider   amphetamine-dextroamphetamine (ADDERALL XR) 15 MG extended release capsule Take 1 capsule by mouth every morning.    Provider, MD Dianne   sertraline (ZOLOFT) 50 MG tablet Take 1 tablet by mouth daily 24   Rebekah Chand, DO   valACYclovir (VALTREX) 500 MG tablet Take 1 tablet by mouth 2 times daily 24   Rebekah Chand, DO        Review of Systems:   Pertinent ROS noted in the HPI.    Objective:     Vitals:  There were no vitals filed for this visit.     Physical Exam:  Constitutional: no distress  Heart: RRR  Lungs: CTAB  Abdomen:

## 2024-10-17 NOTE — H&P
History & Physical    Name: Erlinda Downing MRN: [id number]  SSN: [id number]    YOB: 2000  Age: 24 y.o.  Sex: female      Subjective:     Estimated Date of Delivery: 11/3/24  OB History    Para Term  AB Living   2       1     SAB IAB Ectopic Molar Multiple Live Births   1                # Outcome Date GA Lbr Jeet/2nd Weight Sex Type Anes PTL Lv   2 Current            1 SAB  7w0d              Ms. Downing is admitted with pregnancy at 37w4d for induction of labor due to gestational hypertension. Prenatal course was complicated by  h/o HSV, anxiety, anemia, family h/o muscular dystrophy, positive GBS .  Please see prenatal records for details.    Past Medical History:   Diagnosis Date    Anxiety      Past Surgical History:   Procedure Laterality Date    ORTHOPEDIC SURGERY      wrist    WISDOM TOOTH EXTRACTION       Social History     Occupational History    Not on file   Tobacco Use    Smoking status: Never    Smokeless tobacco: Never   Vaping Use    Vaping status: Never Used   Substance and Sexual Activity    Alcohol use: Not Currently    Drug use: Not Currently    Sexual activity: Yes     Partners: Male     Family History   Problem Relation Age of Onset    Hypertension Maternal Grandfather     High Cholesterol Father        No Known Allergies  Prior to Admission medications    Medication Sig Start Date End Date Taking? Authorizing Provider   amphetamine-dextroamphetamine (ADDERALL XR) 15 MG extended release capsule Take 1 capsule by mouth every morning.    Provider, MD Dianne   sertraline (ZOLOFT) 50 MG tablet Take 1 tablet by mouth daily 24   Rebekah Chand, DO   valACYclovir (VALTREX) 500 MG tablet Take 1 tablet by mouth 2 times daily 24   Rebekah Chand, DO        Review of Systems:   Pertinent ROS noted in the HPI.    Objective:     Vitals:  There were no vitals filed for this visit.     Physical Exam:  Constitutional: no distress  Heart: RRR  Lungs: CTAB  Abdomen:

## 2024-10-17 NOTE — PROGRESS NOTES
Patient Active Problem List    Diagnosis Date Noted    Positive GBS test 10/11/2024    Anemia affecting pregnancy 10/07/2024    Elevated heart rate with elevated blood pressure without diagnosis of hypertension 10/04/2024    Pregnancy 05/08/2024    Genital herpes affecting pregnancy 05/08/2024    Family history of muscular dystrophy 05/08/2024    Anxiety 05/08/2024    BPP 8/8 luis daniel 15  Valtrex daily  Protein /c ratio unable to calc  10/2    Elevated BP and new onset edema in face and LE  Cx 1-2 50 -1 cervix very low  Dr Singleton aware  Labs on arrival at hospital    Will induce for gest htn with worsening bp and symptoms

## 2024-10-18 ENCOUNTER — ANESTHESIA (OUTPATIENT)
Dept: LABOR AND DELIVERY | Age: 24
End: 2024-10-18
Payer: COMMERCIAL

## 2024-10-18 ENCOUNTER — ANESTHESIA EVENT (OUTPATIENT)
Dept: LABOR AND DELIVERY | Age: 24
End: 2024-10-18
Payer: COMMERCIAL

## 2024-10-18 PROCEDURE — 6370000000 HC RX 637 (ALT 250 FOR IP): Performed by: STUDENT IN AN ORGANIZED HEALTH CARE EDUCATION/TRAINING PROGRAM

## 2024-10-18 PROCEDURE — 2580000003 HC RX 258: Performed by: STUDENT IN AN ORGANIZED HEALTH CARE EDUCATION/TRAINING PROGRAM

## 2024-10-18 PROCEDURE — 0KQM0ZZ REPAIR PERINEUM MUSCLE, OPEN APPROACH: ICD-10-PCS | Performed by: STUDENT IN AN ORGANIZED HEALTH CARE EDUCATION/TRAINING PROGRAM

## 2024-10-18 PROCEDURE — 10907ZC DRAINAGE OF AMNIOTIC FLUID, THERAPEUTIC FROM PRODUCTS OF CONCEPTION, VIA NATURAL OR ARTIFICIAL OPENING: ICD-10-PCS | Performed by: STUDENT IN AN ORGANIZED HEALTH CARE EDUCATION/TRAINING PROGRAM

## 2024-10-18 PROCEDURE — 4A1HXCZ MONITORING OF PRODUCTS OF CONCEPTION, CARDIAC RATE, EXTERNAL APPROACH: ICD-10-PCS | Performed by: STUDENT IN AN ORGANIZED HEALTH CARE EDUCATION/TRAINING PROGRAM

## 2024-10-18 PROCEDURE — 7100000011 HC PHASE II RECOVERY - ADDTL 15 MIN

## 2024-10-18 PROCEDURE — 00HU33Z INSERTION OF INFUSION DEVICE INTO SPINAL CANAL, PERCUTANEOUS APPROACH: ICD-10-PCS | Performed by: ANESTHESIOLOGY

## 2024-10-18 PROCEDURE — 6360000002 HC RX W HCPCS: Performed by: STUDENT IN AN ORGANIZED HEALTH CARE EDUCATION/TRAINING PROGRAM

## 2024-10-18 PROCEDURE — 59400 OBSTETRICAL CARE: CPT | Performed by: STUDENT IN AN ORGANIZED HEALTH CARE EDUCATION/TRAINING PROGRAM

## 2024-10-18 PROCEDURE — 7210000100 HC LABOR FEE PER 1 HR

## 2024-10-18 PROCEDURE — 3700000025 EPIDURAL BLOCK: Performed by: ANESTHESIOLOGY

## 2024-10-18 PROCEDURE — 51701 INSERT BLADDER CATHETER: CPT

## 2024-10-18 PROCEDURE — 7220000101 HC DELIVERY VAGINAL/SINGLE

## 2024-10-18 PROCEDURE — 1100000000 HC RM PRIVATE

## 2024-10-18 PROCEDURE — 7100000010 HC PHASE II RECOVERY - FIRST 15 MIN

## 2024-10-18 RX ORDER — SODIUM CHLORIDE, SODIUM LACTATE, POTASSIUM CHLORIDE, CALCIUM CHLORIDE 600; 310; 30; 20 MG/100ML; MG/100ML; MG/100ML; MG/100ML
INJECTION, SOLUTION INTRAVENOUS CONTINUOUS
Status: DISCONTINUED | OUTPATIENT
Start: 2024-10-18 | End: 2024-10-19

## 2024-10-18 RX ORDER — SODIUM CHLORIDE 0.9 % (FLUSH) 0.9 %
5-40 SYRINGE (ML) INJECTION EVERY 12 HOURS SCHEDULED
Status: DISCONTINUED | OUTPATIENT
Start: 2024-10-18 | End: 2024-10-19

## 2024-10-18 RX ORDER — ONDANSETRON 2 MG/ML
4 INJECTION INTRAMUSCULAR; INTRAVENOUS EVERY 6 HOURS PRN
Status: DISCONTINUED | OUTPATIENT
Start: 2024-10-18 | End: 2024-10-20 | Stop reason: HOSPADM

## 2024-10-18 RX ORDER — SODIUM CHLORIDE 9 MG/ML
INJECTION, SOLUTION INTRAVENOUS PRN
Status: DISCONTINUED | OUTPATIENT
Start: 2024-10-18 | End: 2024-10-20 | Stop reason: HOSPADM

## 2024-10-18 RX ORDER — SODIUM CHLORIDE 0.9 % (FLUSH) 0.9 %
5-40 SYRINGE (ML) INJECTION PRN
Status: DISCONTINUED | OUTPATIENT
Start: 2024-10-18 | End: 2024-10-20 | Stop reason: HOSPADM

## 2024-10-18 RX ORDER — IBUPROFEN 800 MG/1
800 TABLET, FILM COATED ORAL EVERY 8 HOURS
Status: DISCONTINUED | OUTPATIENT
Start: 2024-10-18 | End: 2024-10-20 | Stop reason: HOSPADM

## 2024-10-18 RX ORDER — ACETAMINOPHEN 500 MG
1000 TABLET ORAL EVERY 8 HOURS SCHEDULED
Status: DISCONTINUED | OUTPATIENT
Start: 2024-10-18 | End: 2024-10-20 | Stop reason: HOSPADM

## 2024-10-18 RX ORDER — OXYCODONE HYDROCHLORIDE 5 MG/1
5 TABLET ORAL EVERY 4 HOURS PRN
Status: DISCONTINUED | OUTPATIENT
Start: 2024-10-18 | End: 2024-10-20 | Stop reason: HOSPADM

## 2024-10-18 RX ORDER — POLYETHYLENE GLYCOL 3350 17 G/17G
17 POWDER, FOR SOLUTION ORAL DAILY
Status: DISCONTINUED | OUTPATIENT
Start: 2024-10-18 | End: 2024-10-20 | Stop reason: HOSPADM

## 2024-10-18 RX ORDER — LANOLIN
CREAM (ML) TOPICAL PRN
Status: DISCONTINUED | OUTPATIENT
Start: 2024-10-18 | End: 2024-10-20 | Stop reason: HOSPADM

## 2024-10-18 RX ORDER — ROPIVACAINE HYDROCHLORIDE 2 MG/ML
INJECTION, SOLUTION EPIDURAL; INFILTRATION; PERINEURAL
Status: DISCONTINUED | OUTPATIENT
Start: 2024-10-18 | End: 2024-10-18 | Stop reason: SDUPTHER

## 2024-10-18 RX ORDER — ONDANSETRON 4 MG/1
4 TABLET, ORALLY DISINTEGRATING ORAL EVERY 6 HOURS PRN
Status: DISCONTINUED | OUTPATIENT
Start: 2024-10-18 | End: 2024-10-20 | Stop reason: HOSPADM

## 2024-10-18 RX ADMIN — ROPIVACAINE HYDROCHLORIDE 8 ML/HR: 2 INJECTION, SOLUTION EPIDURAL; INFILTRATION; PERINEURAL at 05:06

## 2024-10-18 RX ADMIN — SODIUM CHLORIDE 2.5 MILLION UNITS: 9 INJECTION, SOLUTION INTRAVENOUS at 00:15

## 2024-10-18 RX ADMIN — ROPIVACAINE HYDROCHLORIDE 4 ML: 2 INJECTION, SOLUTION EPIDURAL; INFILTRATION; PERINEURAL at 05:04

## 2024-10-18 RX ADMIN — OXYCODONE 5 MG: 5 TABLET ORAL at 15:48

## 2024-10-18 RX ADMIN — SODIUM CHLORIDE, POTASSIUM CHLORIDE, SODIUM LACTATE AND CALCIUM CHLORIDE: 600; 310; 30; 20 INJECTION, SOLUTION INTRAVENOUS at 05:13

## 2024-10-18 RX ADMIN — IBUPROFEN 800 MG: 800 TABLET, FILM COATED ORAL at 15:15

## 2024-10-18 RX ADMIN — ROPIVACAINE HYDROCHLORIDE 6 ML: 2 INJECTION, SOLUTION EPIDURAL; INFILTRATION; PERINEURAL at 05:05

## 2024-10-18 RX ADMIN — OXYTOCIN 1 MILLI-UNITS/MIN: 10 INJECTION, SOLUTION INTRAMUSCULAR; INTRAVENOUS at 06:33

## 2024-10-18 RX ADMIN — ACETAMINOPHEN 1000 MG: 500 TABLET, FILM COATED ORAL at 12:47

## 2024-10-18 RX ADMIN — OXYTOCIN 87.3 MILLI-UNITS/MIN: 10 INJECTION, SOLUTION INTRAMUSCULAR; INTRAVENOUS at 10:06

## 2024-10-18 RX ADMIN — POLYETHYLENE GLYCOL 3350 17 G: 17 POWDER, FOR SOLUTION ORAL at 20:47

## 2024-10-18 RX ADMIN — SODIUM CHLORIDE 2.5 MILLION UNITS: 9 INJECTION, SOLUTION INTRAVENOUS at 04:26

## 2024-10-18 RX ADMIN — Medication 166.7 ML: at 10:06

## 2024-10-18 RX ADMIN — ACETAMINOPHEN 1000 MG: 500 TABLET, FILM COATED ORAL at 20:47

## 2024-10-18 RX ADMIN — Medication 50 MCG: at 00:12

## 2024-10-18 RX ADMIN — SODIUM CHLORIDE 2.5 MILLION UNITS: 9 INJECTION, SOLUTION INTRAVENOUS at 08:24

## 2024-10-18 RX ADMIN — Medication: at 13:45

## 2024-10-18 ASSESSMENT — PAIN DESCRIPTION - ORIENTATION: ORIENTATION: LOWER

## 2024-10-18 ASSESSMENT — PAIN DESCRIPTION - LOCATION: LOCATION: PERINEUM

## 2024-10-18 ASSESSMENT — PAIN DESCRIPTION - DESCRIPTORS: DESCRIPTORS: BURNING

## 2024-10-18 NOTE — L&D DELIVERY NOTE
Start of Mother's Information      Delivery Blood Loss   Intrapartum & Postpartum: 10/17/24 2202 - 10/18/24 103    Delivery Admission: 10/17/24 1826 - 10/18/24 1039         Intrapartum & Postpartum Delivery Admission    Quantitative Blood Loss (mL) Hospital Encounter 400 grams 400 grams    Total  400 mL 400 mL               End of Mother's Information  Mother: Erlinda Downing #[id number]                Delivery Providers    Delivering clinician: Natividad Singleton MD     Provider Role    Natividad Singleton MD Obstetrician    Sarita Newman RN Primary Nurse    Robert, Cathy Perea, RN Primary  Nurse    Robert, Cathy Perea, RN Charge Nurse    Leigha Cody Surgical Tech               Assessment    Living Status: Living  Delivery Location Comment: [id number]        Skin Color:   Heart Rate:   Reflex Irritability:   Muscle Tone:   Respiratory Effort:   Total:            1 Minute:    0    2    2    2    2    8         5 Minute:    1    2    2    2    2    9                                        Apgars Assigned By: GENET HAWKINS RN              Resuscitation    Method: Bulb Suction, Stimulation             [place] Measurements      Birth Weight: 3220 g   Birth Length: 51 cm     Head Circumference: 32 cm     Chest Circumference: 32 cm                      Delivery Note    Obstetrician:  Natividad Singleton MD    Assistant: none    Pre-Delivery Diagnosis: Term pregnancy, Induced labor, Single fetus, and Pregnancy complicated by: mild pre-eclampsia, h/o HSV, anxiety, anemia, GBS+    Post-Delivery Diagnosis: Living  infant(s) and Female    Intrapartum Event: None    Procedure: Spontaneous vaginal delivery    Epidural: yes    Monitor:  Fetal Heart Tones - External and Uterine Contractions - External    Indications for instrumental delivery: none    Estimated Blood Loss: see QBL    Episiotomy: none    Laceration(s):  2nd degree    Laceration(s) repair: yes with 3-0 Vicryl in the standard

## 2024-10-18 NOTE — PROGRESS NOTES
LC called out from room as pt was ambulating back to bed from bathroom with mother and started to feel dizzy. Charge RN in room with this RN. Pt in bed but \"feels weak and light headed.\" fundus firm and bleeding small. Vitals WNL. Juice and crackers given to pt. Pt feeling much better before RN leaves room. Reiterated the importance of calling out for RN assistance to bathroom.

## 2024-10-18 NOTE — LACTATION NOTE
This note was copied from a baby's chart.  In to see mom and infant for the first time. Mom wanting to attempt to latch infant.in her left breast. Placed infant to mom's left breast in the cross cradle hold. Infant latched and would take a few sucks and stop. She did this for several minutes and fell asleep. Mom then attempted on the right breast in the same position with same results. Infant was a little spitty. Informed mom and dad that this is normal. Reviewed the expectations of the first 24 hours. Lactation consultant will follow  up tomorrow.

## 2024-10-18 NOTE — PROGRESS NOTES
Patient up to bathroom with this RN assistance.  Dina-care taught and completed. Questions encouraged and answered.  Patient ambulating without difficulty, encouraged to call for needs or concerns. Verbalizes understanding.

## 2024-10-18 NOTE — INTERVAL H&P NOTE
Update History & Physical    The patient's History and Physical of October 17, 2024 was reviewed with the patient and I examined the patient. There was no change. The surgical site was confirmed by the patient and me.     Plan: The risks, benefits, expected outcome, and alternative to the recommended procedure have been discussed with the patient. Patient understands and wants to proceed with the procedure.     Electronically signed by Natividad Singleton MD on 10/18/2024 at 9:18 AM

## 2024-10-18 NOTE — ANESTHESIA PROCEDURE NOTES
Epidural Block    Patient location during procedure: OB  Start time: 10/18/2024 4:49 AM  End time: 10/18/2024 5:53 AM  Reason for block: labor epidural  Staffing  Performed: anesthesiologist   Anesthesiologist: Markel Ceja IV, MD  Performed by: Markel Ceja IV, MD  Authorized by: Markel Ceja IV, MD    Epidural  Patient position: sitting  Prep: ChloraPrep  Patient monitoring: continuous pulse ox and frequent blood pressure checks  Approach: right paramedian  Location: L2-3  Injection technique: OLAYINKA air  Provider prep: sterile gloves and mask  Needle  Needle type: Tuohy   Needle gauge: 17 G  Needle insertion depth: 4 cm  Catheter type: end hole  Catheter size: 19 G  Catheter at skin depth: 10 cm  Test dose: negativeCatheter Secured: tegaderm and tape (liquid adhesive)  Assessment  Hemodynamics: stable  Attempts: 1  Outcomes: uncomplicated and patient tolerated procedure well  Additional Notes  Risk of bleeding, infection, and accidental dural puncture discussed.    Local 3ml 1% lidocaine at the skin.  Preanesthetic Checklist  Completed: patient identified, IV checked, site marked, risks and benefits discussed, surgical/procedural consents, equipment checked, pre-op evaluation, timeout performed, anesthesia consent given, oxygen available and monitors applied/VS acknowledged

## 2024-10-18 NOTE — PROGRESS NOTES
Update Note:    To pt's room for SVE. Pt resting comfortably with epidural in place.     SVE: 7-8, bulging bag  Membranes: AROM at 0830, clear fluid  Fetal position is LOP    FHTs: Cat 1, reassuring    Plan:  Continue pitocin  Recheck PRN      Natividad Singleton MD  [facility]

## 2024-10-18 NOTE — ANESTHESIA POSTPROCEDURE EVALUATION
Department of Anesthesiology  Postprocedure Note    Patient: Erlinda Downing  MRN: [id number]  YOB: 2000  Date of evaluation: 10/18/2024    Procedure Summary       Date: 10/18/24 Room / Location:     Anesthesia Start: 0443 Anesthesia Stop: 1002    Procedure: Labor Analgesia Diagnosis:     Scheduled Providers:  Responsible Provider: Cristi Perez MD    Anesthesia Type: epidural ASA Status: 2            Anesthesia Type: No value filed.    Mason Phase I: Mason Score: 10    Mason Phase II:      Anesthesia Post Evaluation    Patient location during evaluation: floor  Patient participation: complete - patient participated  Level of consciousness: awake and alert  Airway patency: patent  Nausea & Vomiting: no nausea and no vomiting  Cardiovascular status: hemodynamically stable  Respiratory status: acceptable, nonlabored ventilation and spontaneous ventilation  Hydration status: euvolemic  Comments: /65   Pulse 63   Temp 97.9 °F (36.6 °C) (Oral)   Resp 16   LMP 01/17/2024 (Exact Date)   SpO2 98%   Breastfeeding Unknown   The patient was satisfied with her labor epidural and denies any complications.  Her lower extremities have returned to baseline neurologically.    Multimodal analgesia pain management approach  Pain management: adequate and satisfactory to patient    No notable events documented.

## 2024-10-18 NOTE — ANESTHESIA PRE PROCEDURE
Department of Anesthesiology  Preprocedure Note       Name:  Erlinda Downing   Age:  24 y.o.  :  2000                                          MRN:  [id number]         Date:  10/18/2024      Surgeon: * No surgeons listed *    Procedure: * No procedures listed *    Medications prior to admission:   Prior to Admission medications    Medication Sig Start Date End Date Taking? Authorizing Provider   amphetamine-dextroamphetamine (ADDERALL XR) 15 MG extended release capsule Take 1 capsule by mouth every morning.   Yes Provider, MD Dianne   sertraline (ZOLOFT) 50 MG tablet Take 1 tablet by mouth daily 24  Yes Rebekah Chand DO   valACYclovir (VALTREX) 500 MG tablet Take 1 tablet by mouth 2 times daily 24  Yes Rebekah Chand DO       Current medications:    Current Facility-Administered Medications   Medication Dose Route Frequency Provider Last Rate Last Admin   • lactated ringers IV soln infusion SOLN   IntraVENous Continuous Natividad Singleton MD       • lactated ringers bolus 500 mL  500 mL IntraVENous PRN Natividad Singleton MD        Or   • lactated ringers bolus 1,000 mL  1,000 mL IntraVENous PRN Natividad Singleton MD       • sodium chloride flush 0.9 % injection 5-40 mL  5-40 mL IntraVENous 2 times per day Natividad Singleton MD       • sodium chloride flush 0.9 % injection 5-40 mL  5-40 mL IntraVENous PRN Natividad Singleton MD       • 0.9 % sodium chloride infusion   IntraVENous PRN Natividad Singleton MD       • methylergonovine (METHERGINE) injection 200 mcg  200 mcg IntraMUSCular PRN Natividad Singleton MD       • carboprost (HEMABATE) injection 250 mcg  250 mcg IntraMUSCular PRN Natividad Singleton MD       • miSOPROStol (CYTOTEC) tablet 400 mcg  400 mcg Buccal PRN Naitvidad Singleton MD       • tranexamic acid-NaCl IVPB premix 1,000 mg  1,000 mg IntraVENous Once PRN Natividad Singleton MD       • oxytocin (PITOCIN) 30 units in 500 mL infusion  87.3 lisseth-units/min IntraVENous Continuous PRN Natividad Singleton MD        And   •

## 2024-10-19 LAB — T PALLIDUM AB SER QL IA: NONREACTIVE

## 2024-10-19 PROCEDURE — 1100000000 HC RM PRIVATE

## 2024-10-19 PROCEDURE — 6370000000 HC RX 637 (ALT 250 FOR IP): Performed by: STUDENT IN AN ORGANIZED HEALTH CARE EDUCATION/TRAINING PROGRAM

## 2024-10-19 PROCEDURE — 6370000000 HC RX 637 (ALT 250 FOR IP): Performed by: OBSTETRICS & GYNECOLOGY

## 2024-10-19 RX ORDER — DOCUSATE SODIUM 100 MG/1
100 CAPSULE, LIQUID FILLED ORAL 2 TIMES DAILY
Status: DISCONTINUED | OUTPATIENT
Start: 2024-10-19 | End: 2024-10-20 | Stop reason: HOSPADM

## 2024-10-19 RX ADMIN — OXYCODONE 5 MG: 5 TABLET ORAL at 00:07

## 2024-10-19 RX ADMIN — IBUPROFEN 800 MG: 800 TABLET, FILM COATED ORAL at 16:59

## 2024-10-19 RX ADMIN — IBUPROFEN 800 MG: 800 TABLET, FILM COATED ORAL at 00:07

## 2024-10-19 RX ADMIN — IBUPROFEN 800 MG: 800 TABLET, FILM COATED ORAL at 09:01

## 2024-10-19 RX ADMIN — ACETAMINOPHEN 1000 MG: 500 TABLET, FILM COATED ORAL at 05:03

## 2024-10-19 RX ADMIN — ACETAMINOPHEN 1000 MG: 500 TABLET, FILM COATED ORAL at 20:11

## 2024-10-19 RX ADMIN — SERTRALINE 50 MG: 50 TABLET, FILM COATED ORAL at 09:02

## 2024-10-19 RX ADMIN — DOCUSATE SODIUM 100 MG: 100 CAPSULE, LIQUID FILLED ORAL at 09:01

## 2024-10-19 RX ADMIN — ACETAMINOPHEN 1000 MG: 500 TABLET, FILM COATED ORAL at 12:22

## 2024-10-19 RX ADMIN — DOCUSATE SODIUM 100 MG: 100 CAPSULE, LIQUID FILLED ORAL at 20:11

## 2024-10-19 RX ADMIN — OXYCODONE 5 MG: 5 TABLET ORAL at 05:03

## 2024-10-19 ASSESSMENT — PAIN DESCRIPTION - DESCRIPTORS: DESCRIPTORS: SHARP;SHOOTING

## 2024-10-19 ASSESSMENT — PAIN DESCRIPTION - LOCATION: LOCATION: PERINEUM

## 2024-10-19 ASSESSMENT — PAIN SCALES - GENERAL: PAINLEVEL_OUTOF10: 9

## 2024-10-19 NOTE — CARE COORDINATION
CM note:    Chart reviewed for updates. CM met with pt at bedside to complete an initial assessment. Demographics and insurance information verified.    Pt has a history of Anxiety and is currently taking Zoloft 50 mg. Her OBGYN is prescribing her Zoloft. She does not have a therapist of psychiatrist. Pt denied any feelings of depression or anxiety.     provided education on [facility]  Home Visit.  Pt has SW contact information.    Patient given informational packet on  mood & anxiety disorders (resources/education).    Patient denies any additional needs from  at this time.  Patient has 's contact information should any needs/questions arise.    MARCIANO Pineda

## 2024-10-19 NOTE — LACTATION NOTE
This note was copied from a baby's chart.  In to see mom and infant for follow up. Baby has been latching and feeding off and on well since birth. At times per RN and mom, baby has hard time staying on breast to consistently feed for longer period of time. Mom trying at this time w/ RN. Lactation stayed and assisted mom. Baby did better in football hold, than cross cradle on right breast. Fed off and on x 8 minutes. Then measured mom's nipples and made pump flange change. She pumped x 15 minutes and got 14 mls. Showed dad how to curve tip syringe and finger feed colostrum back to baby. Baby tolerated well and and dad return demonstrated well. Encouraged mom to feed baby q 2-3 hrs. Pump behind any poor/fair feeds and give back EBM. Reviewed breast milk storage rules. Lactation to follow up in am.

## 2024-10-19 NOTE — PROGRESS NOTES
Post-Partum Day Number 1 Progress Note    Patient doing well post-partum without significant complaint.  Voiding without difficulty, normal lochia.    Vitals:  Patient Vitals for the past 8 hrs:   BP Temp Temp src Pulse Resp SpO2   10/19/24 0749 115/72 98.1 °F (36.7 °C) Oral 70 16 99 %     Temp (24hrs), Av.4 °F (36.9 °C), Min:97.9 °F (36.6 °C), Max:98.8 °F (37.1 °C)      Vital signs stable, afebrile.    Exam:  Patient without distress.               Abdomen soft, fundus firm at level of umbilicus, nontender               Perineum with normal lochia noted.               Lower extremities are negative for swelling, cords or tenderness.    Lab/Data Review:  Lab Results   Component Value Date    WBC 9.1 10/17/2024    HGB 9.4 (L) 10/17/2024    HCT 30.6 (L) 10/17/2024    MCV 82.3 10/17/2024     10/17/2024         Assessment and Plan:  Patient appears to be having uncomplicated post-partum course.  Continue routine perineal care and maternal education.  Plan discharge tomorrow if no problems occur.

## 2024-10-20 ENCOUNTER — LACTATION ENCOUNTER (OUTPATIENT)
Dept: MOTHER INFANT UNIT | Age: 24
End: 2024-10-20

## 2024-10-20 VITALS
HEART RATE: 70 BPM | RESPIRATION RATE: 18 BRPM | SYSTOLIC BLOOD PRESSURE: 119 MMHG | TEMPERATURE: 97.9 F | OXYGEN SATURATION: 98 % | DIASTOLIC BLOOD PRESSURE: 82 MMHG

## 2024-10-20 PROCEDURE — 6370000000 HC RX 637 (ALT 250 FOR IP): Performed by: OBSTETRICS & GYNECOLOGY

## 2024-10-20 PROCEDURE — 6370000000 HC RX 637 (ALT 250 FOR IP): Performed by: STUDENT IN AN ORGANIZED HEALTH CARE EDUCATION/TRAINING PROGRAM

## 2024-10-20 RX ORDER — IBUPROFEN 800 MG/1
800 TABLET, FILM COATED ORAL EVERY 8 HOURS
Qty: 30 TABLET | Refills: 0 | Status: SHIPPED | OUTPATIENT
Start: 2024-10-20

## 2024-10-20 RX ORDER — OXYCODONE HYDROCHLORIDE 5 MG/1
5 TABLET ORAL EVERY 4 HOURS PRN
Qty: 12 TABLET | Refills: 0 | Status: SHIPPED | OUTPATIENT
Start: 2024-10-20 | End: 2024-10-23

## 2024-10-20 RX ADMIN — IBUPROFEN 800 MG: 800 TABLET, FILM COATED ORAL at 01:05

## 2024-10-20 RX ADMIN — IBUPROFEN 800 MG: 800 TABLET, FILM COATED ORAL at 08:51

## 2024-10-20 RX ADMIN — DOCUSATE SODIUM 100 MG: 100 CAPSULE, LIQUID FILLED ORAL at 08:50

## 2024-10-20 RX ADMIN — ACETAMINOPHEN 1000 MG: 500 TABLET, FILM COATED ORAL at 05:50

## 2024-10-20 RX ADMIN — SERTRALINE 50 MG: 50 TABLET, FILM COATED ORAL at 08:51

## 2024-10-20 RX ADMIN — POLYETHYLENE GLYCOL 3350 17 G: 17 POWDER, FOR SOLUTION ORAL at 08:51

## 2024-10-20 NOTE — LACTATION NOTE
This note was copied from a baby's chart.  Individualized Feeding Plan for Breastfeeding   Lactation Services [phone]    As much as possible, hold your baby on your chest so baby’s bare skin is against your bare skin with a blanket covering baby’s back, especially 30 minutes before it is time for baby to eat.    Watch for early feeding cues such as, licking lips, sucking motions, rooting, hands to mouth. Crying is a late feeding cue.      Feed your baby at least 8 times in 24 hours, or more if your baby is showing feeding cues.  If baby is sleepy put baby skin to skin and watch for hunger cues.  To rouse baby: unwrap, undress, massage hands, feet, & back, change diaper, gently change baby’s position from lying to sitting.   15-20 minutes on the first breast of active breastfeeding is considered a good feeding. Good, active breastfeeding is when baby is alert, tugging the nipple, their ear may move, and you can hear swallows.  Allow baby to finish the first side before changing sides.     Sleeping at the breast or only brief, light sucks should not be considered a good, full breastfeed.  At each feedin.  Baby needs to NURSE WELL x 15-20 minutes on at least first breast, burp and offer 2nd breast at every feeding.  If no sustained latch only attempt at breast for 10 minutes.     If baby does not latch on and feed well on at least one side, you should:       2. Double pump for 15 minutes with breast massage and compression.  Hand express for an additional 2-3 minutes per side. Pump after each feeding attempt or poor feeding, up to 8 times per day. If you are not putting baby to the breast you need to pump 8 times a day. Pump every 3 hours.        3. Give baby all of the breast milk you obtain using a straight syringe or  curved syringe.    If baby does NOT have enough wet and dirty diapers per day, is jaundiced/lethargic, or has significant weight loss AND you do NOT pump enough milk for each

## 2024-10-20 NOTE — DISCHARGE SUMMARY
Post-Partum Day Number 2 Progress/Discharge Note    Patient doing well post-partum without significant complaint.  Voiding without difficulty, normal lochia, positive flatus.    Vitals:  Patient Vitals for the past 8 hrs:   BP Temp Temp src Pulse Resp SpO2   10/20/24 0732 119/82 97.9 °F (36.6 °C) Oral 70 18 98 %   10/20/24 0325 122/80 98.4 °F (36.9 °C) Axillary 63 17 98 %     Temp (24hrs), Av.2 °F (36.8 °C), Min:97.9 °F (36.6 °C), Max:98.7 °F (37.1 °C)      Vital signs stable, afebrile.    Exam:  Patient without distress.               Abdomen soft, fundus firm at level of umbilicus, non tender               Perineum with normal lochia noted.               Lower extremities are negative for swelling, cords or tenderness.    Lab/Data Review:  Lab Results   Component Value Date    WBC 9.1 10/17/2024    HGB 9.4 (L) 10/17/2024    HCT 30.6 (L) 10/17/2024    MCV 82.3 10/17/2024     10/17/2024         Assessment and Plan:  Patient appears to be having uncomplicated post-partum course.  Continue routine perineal care and maternal education.  Plan discharge for today with follow up in our office in 1-2 weeks.

## 2024-10-20 NOTE — PROGRESS NOTES
Patient discharged to home per MD orders.  Discharge instructions reviewed with patient. Questions encouraged and answered. Patient verbalizes understanding.         Patient to call out when ready to be escorted out

## 2024-10-24 ENCOUNTER — TELEPHONE (OUTPATIENT)
Dept: OBGYN CLINIC | Age: 24
End: 2024-10-24

## 2024-10-24 NOTE — TELEPHONE ENCOUNTER
Pt lvm she has been having elevated BP the last couple of days. Did not leave levels on vm. Pt is 1 week PP and Dr. Singleton delivered. Requesting a call back please.

## 2024-10-30 ENCOUNTER — POSTPARTUM VISIT (OUTPATIENT)
Dept: OBGYN CLINIC | Age: 24
End: 2024-10-30

## 2024-10-30 VITALS
HEIGHT: 67 IN | WEIGHT: 156.9 LBS | SYSTOLIC BLOOD PRESSURE: 130 MMHG | DIASTOLIC BLOOD PRESSURE: 80 MMHG | BODY MASS INDEX: 24.63 KG/M2

## 2024-10-30 DIAGNOSIS — F41.9 ANXIETY: ICD-10-CM

## 2024-10-30 PROCEDURE — 0503F POSTPARTUM CARE VISIT: CPT | Performed by: STUDENT IN AN ORGANIZED HEALTH CARE EDUCATION/TRAINING PROGRAM

## 2024-10-30 NOTE — PROGRESS NOTES
2 Week Postpartum Visit    Name: Erlinda Downing    Date: 10/30/2024    Age: 24 y.o.    OB History          2    Para   1    Term   1            AB   1    Living   1         SAB   1    IAB        Ectopic        Molar        Multiple   0    Live Births   1              /80   Ht 1.702 m (5' 7\")   Wt 71.2 kg (156 lb 14.4 oz)   LMP 2024 (Exact Date)        Delivered by Dr. Singleton on 10/18/24 by , w/ 2nd perineal laceration.    Infant's Name: Da Infant's Gender: Female  Birth Weight: 7lb 1.6oz Feeding: breast   Desired Contraception: none    Last pap: 23 (Negative, HPV not performed)- [facility]      Current Outpatient Medications   Medication Sig Dispense Refill    ibuprofen (ADVIL;MOTRIN) 800 MG tablet Take 1 tablet by mouth every 8 (eight) hours 30 tablet 0    sertraline (ZOLOFT) 50 MG tablet Take 1 tablet by mouth daily 30 tablet 4    valACYclovir (VALTREX) 500 MG tablet Take 1 tablet by mouth 2 times daily 60 tablet 11     No current facility-administered medications for this visit.       Physical Exam  OBGyn Exam     Moods stable  Lochia appropriate  Declines BC at this time  Breastfeeding going well with minimal discomfort    Not cleared for sexual activity at this time    RTO in 4w for 6w pp visit      Natividad Singleton MD

## 2024-11-25 ENCOUNTER — POSTPARTUM VISIT (OUTPATIENT)
Dept: OBGYN CLINIC | Age: 24
End: 2024-11-25

## 2024-11-25 VITALS
DIASTOLIC BLOOD PRESSURE: 68 MMHG | WEIGHT: 154.3 LBS | BODY MASS INDEX: 24.22 KG/M2 | SYSTOLIC BLOOD PRESSURE: 122 MMHG | HEIGHT: 67 IN

## 2024-11-25 PROCEDURE — 0503F POSTPARTUM CARE VISIT: CPT | Performed by: STUDENT IN AN ORGANIZED HEALTH CARE EDUCATION/TRAINING PROGRAM

## 2024-11-25 NOTE — PROGRESS NOTES
6 Week Postpartum Visit    Name: Erlinda Downing    Date: 2024    Age: 24 y.o.    OB History          2    Para   1    Term   1            AB   1    Living   1         SAB   1    IAB        Ectopic        Molar        Multiple   0    Live Births   1              /68   Ht 1.702 m (5' 7\")   Wt 70 kg (154 lb 4.8 oz)   LMP 2024 (Exact Date)        Delivered by Dr. Singleton on 10/18/24 by , w/ 2nd perineal laceration.     Infant's Name: Da     Infant's Gender: Female  Birth Weight: 7lb 1.6oz           Feeding: breast             Desired Contraception: none     Last pap: 23 (Negative, HPV not performed)- [facility]      Current Outpatient Medications   Medication Sig Dispense Refill    sertraline (ZOLOFT) 50 MG tablet Take 1 tablet by mouth daily 90 tablet 3    valACYclovir (VALTREX) 500 MG tablet Take 1 tablet by mouth 2 times daily 60 tablet 11     No current facility-administered medications for this visit.       Physical Exam  OBGyn Exam     Moods stable  Lochia appropriate  Laceration c/d/I healing well  Declines BC at this time  Breastfeeding going well with minimal discomfort    Not cleared for sexual activity at this time  May resume all activity    GHTN- /68, not on medications.    RTO in 6 months for annual visit      Natividad Singleton MD

## 2025-05-28 ENCOUNTER — OFFICE VISIT (OUTPATIENT)
Dept: OBGYN CLINIC | Age: 25
End: 2025-05-28
Payer: MEDICAID

## 2025-05-28 VITALS — SYSTOLIC BLOOD PRESSURE: 134 MMHG | WEIGHT: 147.3 LBS | DIASTOLIC BLOOD PRESSURE: 78 MMHG | BODY MASS INDEX: 23.07 KG/M2

## 2025-05-28 DIAGNOSIS — Z01.419 WELL WOMAN EXAM: Primary | ICD-10-CM

## 2025-05-28 DIAGNOSIS — N81.89 PELVIC FLOOR WEAKNESS: ICD-10-CM

## 2025-05-28 PROBLEM — O13.3 GESTATIONAL HYPERTENSION, THIRD TRIMESTER: Status: RESOLVED | Noted: 2024-10-04 | Resolved: 2025-05-28

## 2025-05-28 PROBLEM — Z34.90 PREGNANCY: Status: RESOLVED | Noted: 2024-05-08 | Resolved: 2025-05-28

## 2025-05-28 PROBLEM — O98.319 GENITAL HERPES AFFECTING PREGNANCY: Status: RESOLVED | Noted: 2024-05-08 | Resolved: 2025-05-28

## 2025-05-28 PROBLEM — O99.019 ANEMIA AFFECTING PREGNANCY: Status: RESOLVED | Noted: 2024-10-07 | Resolved: 2025-05-28

## 2025-05-28 PROBLEM — B95.1 POSITIVE GBS TEST: Status: RESOLVED | Noted: 2024-10-11 | Resolved: 2025-05-28

## 2025-05-28 PROBLEM — A60.09 GENITAL HERPES AFFECTING PREGNANCY: Status: RESOLVED | Noted: 2024-05-08 | Resolved: 2025-05-28

## 2025-05-28 PROCEDURE — 99395 PREV VISIT EST AGE 18-39: CPT | Performed by: STUDENT IN AN ORGANIZED HEALTH CARE EDUCATION/TRAINING PROGRAM

## 2025-05-28 RX ORDER — DEXTROAMPHETAMINE SACCHARATE, AMPHETAMINE ASPARTATE, DEXTROAMPHETAMINE SULFATE AND AMPHETAMINE SULFATE 7.5; 7.5; 7.5; 7.5 MG/1; MG/1; MG/1; MG/1
TABLET ORAL
COMMUNITY
Start: 2025-05-15

## 2025-05-28 NOTE — PROGRESS NOTES
HPI:  Ms. Downing is a 24 y.o.  who is here today for a well woman exam. She complains of pain in her tailbone - feels like bruising.  Pt states she is very gassy - no abdominal pain.      Date Performed Result   PAP 23  Negative, HPV not performed    Mammogram na na   Colonoscopy na na   Dexa na na     GYN History     LMP: breastfeeding      Past Medical History:  Past Medical History:   Diagnosis Date    Anxiety        Past Surgical History:  Past Surgical History:   Procedure Laterality Date    ORTHOPEDIC SURGERY      wrist    WISDOM TOOTH EXTRACTION         Allergies:   No Known Allergies    Medication History:  Current Outpatient Medications   Medication Sig Dispense Refill    amphetamine-dextroamphetamine (ADDERALL) 30 MG tablet TAKE 1 TABLET (30 MG) BY MOUTH 2 (TWO) TIMES A DAY AS NEEDED (CONCENTRATION) MAX DAILY AMOUNT: 60 MG      sertraline (ZOLOFT) 50 MG tablet Take 1 tablet by mouth daily 90 tablet 3    valACYclovir (VALTREX) 500 MG tablet Take 1 tablet by mouth 2 times daily 60 tablet 11     No current facility-administered medications for this visit.       Social History:  Social History     Socioeconomic History    Marital status:      Spouse name: Not on file    Number of children: Not on file    Years of education: Not on file    Highest education level: Not on file   Occupational History    Not on file   Tobacco Use    Smoking status: Never    Smokeless tobacco: Never   Vaping Use    Vaping status: Never Used   Substance and Sexual Activity    Alcohol use: Not Currently    Drug use: Not Currently    Sexual activity: Yes     Partners: Male   Other Topics Concern    Not on file   Social History Narrative    Not on file     Social Drivers of Health     Financial Resource Strain: Low Risk  (3/13/2025)    Received from [facility]    Financial Resource Strain     Difficulty Paying Living Expenses: Not hard at all     Difficulty Paying Medical Expenses: No   Food Insecurity: No Food

## 2025-06-16 NOTE — THERAPY EVALUATION
Discharge Goals: Time Frame: 12 weeks  Patient will demonstrate ability to voluntarily contract the pelvic floor muscles prior to a cough or sneeze for decreased leakage during increases in intra-abdominal pressure.   Patient will demonstrate independence with a home exercise program for aerobic conditioning, core stabilization, and general mobility for independent management of symptoms.   Patient will demonstrate appropriate co-contraction of the transversus abdominis and pelvic floor muscle group during exhalation in order to reduce round ligament pain and decrease pain with ADLs.            Medical Necessity:   > Skilled intervention continues to be required due to above mentioned deficits.  Reason For Services/Other Comments:  > Patient continues to require skilled intervention due to above mentioned deficits.    Regarding Erlinda Downing's therapy, I certify that the treatment plan above will be carried out by a therapist or under their direction.  Thank you for this referral,  Selena Lopez PT       Referring Physician Signature: Natividad Singleton MD _______________________________ Date _____________        Charge Capture  Appt Desk  Attendance Report  Events        No future appointments.

## 2025-06-16 NOTE — PROGRESS NOTES
Erlinda Downing  : 2000  Primary: [employer] (Medicaid Managed)  Secondary:  [facility] @ [address]  [facility] [address]  Phone: [phone]  Fax: [phone] Plan Frequency: 1x/week for 90 days  Plan of Care/Certification Expiration Date: 25        Plan of Care/Certification Expiration Date:  Plan of Care/Certification Expiration Date: 25    Frequency/Duration: Plan Frequency: 1x/week for 90 days      Time In/Out:   Time In: 845  Time Out: 922      PT Visit Info:    Progress Note Due Date: 25      Visit Count:  1    OUTPATIENT PHYSICAL THERAPY:   Treatment Note 2025       Episode  (PFPT)               Treatment Diagnosis:    Lack of coordination  Stress incontinence (female) (male)  Coccyx pain  Medical/Referring Diagnosis:    Pelvic floor weakness    Referring Physician:  Natividad Singleton MD MD Orders:  PT Eval and Treat   Return MD Appt:     Date of Onset:  No data recorded   Allergies:   Patient has no known allergies.  Restrictions/Precautions:   None      Interventions Planned (Treatment may consist of any combination of the following):     See Assessment Note    Subjective Comments:     Initial Pain Level::     /10  Post Session Pain Level:       /10  Medications Last Reviewed:  2025  Updated Objective Findings:  See Evaluation Note from today  Treatment       THERAPEUTIC ACTIVITY: ( 0 minutes): Functional activity education regarding anatomy, pathology and role of pelvic floor muscle (PFM) function in relation to presenting symptoms and role of pelvic floor therapy in conservative treatment. and Instruction on coordinated pelvic floor and diaphragmatic breathing to improve kinesthetic awareness of pelvic muscle mobility and restore proper motor recruitment patterns with breathing, posture, and functional movement (e.g. appropriate lift/contraction with increased IAP such as a cough, laugh, sneeze to

## 2025-06-20 ENCOUNTER — HOSPITAL ENCOUNTER (OUTPATIENT)
Dept: PHYSICAL THERAPY | Age: 25
Setting detail: RECURRING SERIES
Discharge: HOME | End: 2025-06-23
Attending: STUDENT IN AN ORGANIZED HEALTH CARE EDUCATION/TRAINING PROGRAM
Payer: MEDICAID

## 2025-06-20 DIAGNOSIS — M53.3 COCCYX PAIN: ICD-10-CM

## 2025-06-20 DIAGNOSIS — N39.3 STRESS INCONTINENCE (FEMALE) (MALE): ICD-10-CM

## 2025-06-20 DIAGNOSIS — R27.9 LACK OF COORDINATION: Primary | ICD-10-CM

## 2025-06-20 PROCEDURE — 97161 PT EVAL LOW COMPLEX 20 MIN: CPT

## 2025-06-20 ASSESSMENT — PAIN SCALES - GENERAL: PAINLEVEL_OUTOF10: 1

## 2025-06-25 NOTE — PROGRESS NOTES
Erlinda Downing  : 2000  Primary: [employer] (Medicaid Managed)  Secondary:  [facility] @ [address]  [facility] [address]  Phone: [phone]  Fax: [phone] Plan Frequency: 1x/week for 90 days  Plan of Care/Certification Expiration Date: 25        Plan of Care/Certification Expiration Date:  Plan of Care/Certification Expiration Date: 25    Frequency/Duration: Plan Frequency: 1x/week for 90 days      Time In/Out:   Time In: 902  Time Out: 958      PT Visit Info:    Progress Note Due Date: 25      Visit Count:  2    OUTPATIENT PHYSICAL THERAPY:   Treatment Note 2025       Episode  (PFPT)               Treatment Diagnosis:    No data found  Lack of coordination  Stress incontinence (female) (male)  Coccyx pain  Contributing Diagnosis:  Nocturia (R35.1), Pelvic and perineal pain (R10.2), Pelvic floor dysfunction in female (M62.98), and Urgency of urination (R39.15)  Medical/Referring Diagnosis:    Pelvic floor weakness    Referring Physician:  Natividad Singleton MD MD Orders:  PT Eval and Treat   Return MD Appt:     Date of Onset:  No data recorded   Allergies:   Patient has no known allergies.  Restrictions/Precautions:   None      Interventions Planned (Treatment may consist of any combination of the following):     See Assessment Note    Subjective Comments:     Coccyx pain since 3rd trimester. She reports this has improved but is still a dull ache. She notices it mostly with sitting in the car or on harder seats. She reports that she feels it the most moving from sitting to standing.  She had her first child in October. She is still dealing with frequent urination and nocturia.  She had a grade 2 tear and a hematoma.   She was told she has a prolapse. She is not having pressure or a bulging sensation.         Exercise:      Lifting weights, riding bike, working on breathing exercises    25:    She reports

## 2025-06-27 ENCOUNTER — HOSPITAL ENCOUNTER (OUTPATIENT)
Dept: PHYSICAL THERAPY | Age: 25
Setting detail: RECURRING SERIES
Discharge: HOME | End: 2025-06-30
Attending: STUDENT IN AN ORGANIZED HEALTH CARE EDUCATION/TRAINING PROGRAM
Payer: MEDICAID

## 2025-06-27 PROBLEM — Z01.419 WELL WOMAN EXAM: Status: RESOLVED | Noted: 2025-05-28 | Resolved: 2025-06-27

## 2025-06-27 PROCEDURE — 97140 MANUAL THERAPY 1/> REGIONS: CPT

## 2025-06-27 PROCEDURE — 97110 THERAPEUTIC EXERCISES: CPT

## 2025-06-27 ASSESSMENT — PAIN SCALES - GENERAL: PAINLEVEL_OUTOF10: 0

## 2025-07-11 ENCOUNTER — HOSPITAL ENCOUNTER (OUTPATIENT)
Dept: PHYSICAL THERAPY | Age: 25
Setting detail: RECURRING SERIES
Discharge: HOME | End: 2025-07-14
Attending: STUDENT IN AN ORGANIZED HEALTH CARE EDUCATION/TRAINING PROGRAM
Payer: MEDICAID

## 2025-07-11 PROCEDURE — 97110 THERAPEUTIC EXERCISES: CPT

## 2025-07-11 PROCEDURE — 97140 MANUAL THERAPY 1/> REGIONS: CPT

## 2025-07-11 ASSESSMENT — PAIN SCALES - GENERAL: PAINLEVEL_OUTOF10: 0

## 2025-07-11 NOTE — PROGRESS NOTES
Erlinda Downing  : 2000  Primary: [employer] (Medicaid Managed)  Secondary:  [facility] @ [address]  [facility] [address]  Phone: [phone]  Fax: [phone] Plan Frequency: 1x/week for 90 days  Plan of Care/Certification Expiration Date: 25        Plan of Care/Certification Expiration Date:  Plan of Care/Certification Expiration Date: 25    Frequency/Duration: Plan Frequency: 1x/week for 90 days      Time In/Out:   Time In: 0900  Time Out: 0956      PT Visit Info:    Progress Note Due Date: 25      Visit Count:  3    OUTPATIENT PHYSICAL THERAPY:   Treatment Note 2025       Episode  (PFPT)               Treatment Diagnosis:    No data found  Lack of coordination  Stress incontinence (female) (male)  Coccyx pain  Contributing Diagnosis:  Nocturia (R35.1), Pelvic and perineal pain (R10.2), Pelvic floor dysfunction in female (M62.98), and Urgency of urination (R39.15)  Medical/Referring Diagnosis:    Pelvic floor weakness    Referring Physician:  Natividad Singleton MD MD Orders:  PT Eval and Treat   Return MD Appt:     Date of Onset:  No data recorded   Allergies:   Patient has no known allergies.  Restrictions/Precautions:   None      Interventions Planned (Treatment may consist of any combination of the following):     See Assessment Note    Subjective Comments:     Coccyx pain since 3rd trimester. She reports this has improved but is still a dull ache. She notices it mostly with sitting in the car or on harder seats. She reports that she feels it the most moving from sitting to standing.  She had her first child in October. She is still dealing with frequent urination and nocturia.  She had a grade 2 tear and a hematoma.   She was told she has a prolapse. She is not having pressure or a bulging sensation.         Exercise:      Lifting weights, riding bike, working on breathing exercises    25:    She reports

## 2025-07-18 ENCOUNTER — HOSPITAL ENCOUNTER (OUTPATIENT)
Dept: PHYSICAL THERAPY | Age: 25
Setting detail: RECURRING SERIES
Discharge: HOME | End: 2025-07-21
Attending: STUDENT IN AN ORGANIZED HEALTH CARE EDUCATION/TRAINING PROGRAM
Payer: MEDICAID

## 2025-07-18 PROCEDURE — 97110 THERAPEUTIC EXERCISES: CPT

## 2025-07-18 ASSESSMENT — PAIN SCALES - GENERAL: PAINLEVEL_OUTOF10: 0

## 2025-07-18 NOTE — PROGRESS NOTES
Erlinda Downing  : 2000  Primary: [employer] (Medicaid Managed)  Secondary:  [facility] @ [address]  [facility] [address]  Phone: [phone]  Fax: [phone] Plan Frequency: 1x/week for 90 days  Plan of Care/Certification Expiration Date: 25        Plan of Care/Certification Expiration Date:  Plan of Care/Certification Expiration Date: 25    Frequency/Duration: Plan Frequency: 1x/week for 90 days      Time In/Out:   Time In: 902  Time Out: 952      PT Visit Info:    Progress Note Due Date: 25      Visit Count:  4    OUTPATIENT PHYSICAL THERAPY:   Treatment Note 2025       Episode  (PFPT)               Treatment Diagnosis:    No data found  Lack of coordination  Stress incontinence (female) (male)  Coccyx pain  Contributing Diagnosis:  Nocturia (R35.1), Pelvic and perineal pain (R10.2), Pelvic floor dysfunction in female (M62.98), and Urgency of urination (R39.15)  Medical/Referring Diagnosis:    Pelvic floor weakness    Referring Physician:  Natividad Singleton MD MD Orders:  PT Eval and Treat   Return MD Appt:     Date of Onset:  No data recorded   Allergies:   Patient has no known allergies.  Restrictions/Precautions:   None      Interventions Planned (Treatment may consist of any combination of the following):     See Assessment Note    Subjective Comments:     Coccyx pain since 3rd trimester. She reports this has improved but is still a dull ache. She notices it mostly with sitting in the car or on harder seats. She reports that she feels it the most moving from sitting to standing.  She had her first child in October. She is still dealing with frequent urination and nocturia.  She had a grade 2 tear and a hematoma.   She was told she has a prolapse. She is not having pressure or a bulging sensation.         Exercise:      Lifting weights, riding bike, working on breathing exercises          25:    Patient

## 2025-07-25 ENCOUNTER — APPOINTMENT (OUTPATIENT)
Dept: PHYSICAL THERAPY | Age: 25
End: 2025-07-25
Attending: STUDENT IN AN ORGANIZED HEALTH CARE EDUCATION/TRAINING PROGRAM
Payer: MEDICAID